# Patient Record
Sex: FEMALE | Race: WHITE | Employment: FULL TIME | URBAN - METROPOLITAN AREA
[De-identification: names, ages, dates, MRNs, and addresses within clinical notes are randomized per-mention and may not be internally consistent; named-entity substitution may affect disease eponyms.]

---

## 2017-03-30 ENCOUNTER — APPOINTMENT (OUTPATIENT)
Dept: PHYSICAL THERAPY | Age: 42
End: 2017-03-30

## 2017-04-14 ENCOUNTER — APPOINTMENT (OUTPATIENT)
Dept: PHYSICAL THERAPY | Age: 42
End: 2017-04-14

## 2019-10-28 ENCOUNTER — OFFICE VISIT (OUTPATIENT)
Dept: FAMILY MEDICINE CLINIC | Age: 44
End: 2019-10-28

## 2019-10-28 VITALS
TEMPERATURE: 98.6 F | HEIGHT: 68 IN | BODY MASS INDEX: 32.16 KG/M2 | RESPIRATION RATE: 20 BRPM | HEART RATE: 83 BPM | DIASTOLIC BLOOD PRESSURE: 89 MMHG | WEIGHT: 212.2 LBS | SYSTOLIC BLOOD PRESSURE: 153 MMHG | OXYGEN SATURATION: 93 %

## 2019-10-28 DIAGNOSIS — E66.9 OBESITY (BMI 30.0-34.9): ICD-10-CM

## 2019-10-28 DIAGNOSIS — L30.9 ECZEMA, UNSPECIFIED TYPE: ICD-10-CM

## 2019-10-28 DIAGNOSIS — M50.30 DDD (DEGENERATIVE DISC DISEASE), CERVICAL: ICD-10-CM

## 2019-10-28 DIAGNOSIS — I10 ESSENTIAL HYPERTENSION: Primary | ICD-10-CM

## 2019-10-28 DIAGNOSIS — E78.2 MIXED HYPERLIPIDEMIA: ICD-10-CM

## 2019-10-28 RX ORDER — BETAMETHASONE DIPROPIONATE 0.5 MG/G
1 CREAM TOPICAL 2 TIMES DAILY
COMMUNITY
End: 2019-10-28 | Stop reason: SDUPTHER

## 2019-10-28 RX ORDER — KETOCONAZOLE 20 MG/ML
1 SHAMPOO TOPICAL AS NEEDED
Refills: 3 | COMMUNITY
Start: 2019-09-24 | End: 2020-08-20

## 2019-10-28 RX ORDER — TRIAMCINOLONE ACETONIDE 0.25 MG/G
CREAM TOPICAL
Refills: 2 | COMMUNITY
Start: 2019-10-22 | End: 2020-08-20

## 2019-10-28 RX ORDER — AMITRIPTYLINE HYDROCHLORIDE 25 MG/1
TABLET, FILM COATED ORAL
Refills: 4 | COMMUNITY
Start: 2019-10-22 | End: 2019-10-28

## 2019-10-28 RX ORDER — MONTELUKAST SODIUM 10 MG/1
10 TABLET ORAL
COMMUNITY
Start: 2013-04-17 | End: 2020-08-20

## 2019-10-28 RX ORDER — PREGABALIN 100 MG/1
CAPSULE ORAL
Refills: 4 | COMMUNITY
Start: 2019-10-22 | End: 2020-08-20

## 2019-10-28 RX ORDER — BETAMETHASONE DIPROPIONATE 0.5 MG/G
1 CREAM TOPICAL 2 TIMES DAILY
Qty: 15 G | Refills: 5 | Status: SHIPPED | OUTPATIENT
Start: 2019-10-28 | End: 2020-08-20 | Stop reason: SDUPTHER

## 2019-10-28 RX ORDER — METHYLPREDNISOLONE 4 MG/1
TABLET ORAL
Refills: 0 | COMMUNITY
Start: 2019-09-27 | End: 2019-10-28

## 2019-10-28 RX ORDER — LOSARTAN POTASSIUM 50 MG/1
50 TABLET ORAL DAILY
Qty: 30 TAB | Refills: 2 | Status: SHIPPED | OUTPATIENT
Start: 2019-10-28 | End: 2019-12-05

## 2019-10-28 NOTE — PROGRESS NOTES
Curtis Elizabeth     Chief Complaint   Patient presents with    Establish Care    Hypertension     management    Allergies    Insomnia     Vitals:    10/28/19 1408   BP: 153/89   Pulse: 83   Resp: 20   Temp: 98.6 °F (37 °C)   TempSrc: Oral   SpO2: 93%   Weight: 212 lb 3.2 oz (96.3 kg)   Height: 5' 8\" (1.727 m)   PainSc:   0 - No pain   LMP: 10/11/2019         HPI: is here to establish care her previous PCP has retired 10 years ago ,has not been to any PCP ,but following up with a nurse at work ,BP has been High below are some recorded readings over the last few months    154/93   161 /92  173/114  163/111  160/100    Patient has chronic neck pain  Neck DDD as well as     Left arm pain due to an accident at work    seeing neurologist  Kyree Dodd she on lyrica     Had right hip replacement due to the same injury      Has hyperlipemia  Has  Not been on any medications                            Past Medical History:   Diagnosis Date    Eczema      Past Surgical History:   Procedure Laterality Date    HX HIP REPLACEMENT Right 2005     Social History     Tobacco Use    Smoking status: Current Some Day Smoker    Smokeless tobacco: Never Used   Substance Use Topics    Alcohol use: Yes       Family History   Problem Relation Age of Onset    Hypertension Mother     Diabetes Father     Hypertension Father     Breast Cancer Maternal Aunt        Review of Systems   Constitutional: Negative for chills, fever, malaise/fatigue and weight loss. HENT: Negative for congestion, ear discharge, ear pain, hearing loss, nosebleeds, sinus pain and sore throat. Eyes: Negative for blurred vision, double vision and discharge. Respiratory: Negative for cough, hemoptysis, sputum production and wheezing. Cardiovascular: Negative for chest pain, palpitations, claudication and leg swelling. Gastrointestinal: Negative for abdominal pain, constipation, diarrhea, nausea and vomiting.    Genitourinary: Negative for dysuria, frequency and urgency. Musculoskeletal: Negative for back pain, joint pain, myalgias and neck pain. Skin: Negative for itching and rash. Neurological: Positive for headaches. Negative for dizziness, tingling, sensory change, speech change, focal weakness and weakness. Psychiatric/Behavioral: Negative for depression, hallucinations, substance abuse and suicidal ideas. The patient is not nervous/anxious. Physical Exam   Constitutional: She is oriented to person, place, and time. She appears well-developed and well-nourished. No distress. HENT:   Head: Normocephalic and atraumatic. Mouth/Throat: Oropharynx is clear and moist. No oropharyngeal exudate. Eyes: Pupils are equal, round, and reactive to light. Conjunctivae are normal. Right eye exhibits no discharge. Left eye exhibits no discharge. No scleral icterus. Neck: No thyromegaly present. Cardiovascular: Normal rate, regular rhythm and normal heart sounds. No murmur heard. Pulmonary/Chest: Effort normal and breath sounds normal. No respiratory distress. She has no wheezes. She has no rales. She exhibits no tenderness. Abdominal: Soft. She exhibits no distension. There is no tenderness. There is no rebound. Musculoskeletal: Normal range of motion. She exhibits no edema, tenderness or deformity. Lymphadenopathy:     She has no cervical adenopathy. Neurological: She is alert and oriented to person, place, and time. No cranial nerve deficit. Coordination normal.   Skin: Skin is warm and dry. No rash noted. She is not diaphoretic. No erythema. No pallor. Psychiatric: She has a normal mood and affect. Her behavior is normal. Judgment and thought content normal.   Nursing note and vitals reviewed. Assessment and plan     Plan of care has been discussed with the patient, he agrees to the plan and verbalized understanding.   All his questions were answered  More than 50% of the time spent in this visit was counseling the patient about  illness and treatment options         1. Eczema, unspecified type    Has been advised to use this cream for max one week at a time to avoid skin thinning and hypo-and hyperpigmentation  - betamethasone dipropionate (DIPROSONE) 0.05 % topical cream; Apply 1 g to affected area two (2) times a day. Dispense: 15 g; Refill: 5    2. DDD (degenerative disc disease), cervical  Stable on Lyrica    3. Essential hypertension    Start patient on losartan 50 mg daily    - METABOLIC PANEL, COMPREHENSIVE; Future  - losartan (COZAAR) 50 mg tablet; Take 1 Tab by mouth daily. Dispense: 30 Tab; Refill: 2    4. Obesity (BMI 30.0-34. 9)    Lifestyle modification has been discussed with patient  5. Mixed hyperlipidemia    Has not been on any statin will check lipid profile    - LIPID PANEL; Future    Current Outpatient Medications   Medication Sig Dispense Refill    ketoconazole (NIZORAL) 2 % shampoo Apply 1 mL to affected area as needed. 3    pregabalin (LYRICA) 100 mg capsule TK ONE C PO ONCE A DAY IN THE MORNING AND 2 CS Q NIGHT  4    triamcinolone acetonide (KENALOG) 0.025 % topical cream TRACEY AA OF LEGS AND BREAST BID FOR 2 WEEKS FOR ECZEMA  2    Pamabrom (DIURETIC SOFTGELS) 50 mg cap Take 1 Cap by mouth daily.  betamethasone dipropionate (DIPROSONE) 0.05 % topical cream Apply 1 g to affected area two (2) times a day. 15 g 5    losartan (COZAAR) 50 mg tablet Take 1 Tab by mouth daily. 30 Tab 2    montelukast (SINGULAIR) 10 mg tablet Take 10 mg by mouth. There are no active problems to display for this patient. No results found for this or any previous visit. No visits with results within 3 Month(s) from this visit. Latest known visit with results is:   No results found for any previous visit. Follow-up and Dispositions    · Return in about 3 weeks (around 11/18/2019).

## 2019-10-28 NOTE — PROGRESS NOTES
Jen Cid is a 40 y.o. female presents in office for establish care, allergies, insomnia, and hypertension. Health Maintenance Due   Topic Date Due    Pneumococcal 0-64 years (1 of 1 - PPSV23) 09/10/1981    DTaP/Tdap/Td series (1 - Tdap) 09/10/1996    PAP AKA CERVICAL CYTOLOGY  09/10/1996     1. Have you been to the ER, urgent care clinic since your last visit? Hospitalized since your last visit?no    2. Have you seen or consulted any other health care providers outside of the [address] [place] [address] since your last visit? Include any pap smears or colon screening. Patient had pap smear this morning, along with lab work at [address] [place] [address]. I will have patient sign COLLIN.

## 2019-10-28 NOTE — PROGRESS NOTES
Kodak Abdalla Chief Complaint Patient presents with [address] [place] [address]  Hypertension  
  management  Allergies  Insomnia Vitals:  
 10/28/19 1408 BP: 153/89 Pulse: 83 Resp: 20 Temp: 98.6 °F (37 °C) TempSrc: Oral  
SpO2: 93% Weight: 212 lb 3.2 oz (96.3 kg) Height: 5' 8\" (1.727 m) PainSc:   0 - No pain LMP: 10/11/2019 HPI:She is here for dizziness , fatigue body aches,feverand chills for 2s=days Past Medical History:  
Diagnosis Date  Eczema Past Surgical History:  
Procedure Laterality Date  HX HIP REPLACEMENT Right 2005 Social History Tobacco Use  Smoking status: Current Some Day Smoker  Smokeless tobacco: Never Used Substance Use Topics  Alcohol use: Yes Family History Problem Relation Age of Onset  Hypertension Mother  Diabetes Father  Hypertension Father  Breast Cancer Maternal Aunt Review of Systems Constitutional: Positive for chills, diaphoresis, fever and malaise/fatigue. Negative for weight loss. HENT: Positive for congestion and ear pain. Negative for ear discharge, hearing loss, nosebleeds, sinus pain and sore throat. Eyes: Negative for blurred vision, double vision and discharge. Respiratory: Positive for cough and shortness of breath. Negative for hemoptysis, sputum production and wheezing. Cardiovascular: Negative for chest pain, palpitations, claudication and leg swelling. Gastrointestinal: Positive for nausea. Negative for abdominal pain, constipation, diarrhea, heartburn and vomiting. Genitourinary: Positive for frequency. Negative for dysuria and urgency. Musculoskeletal: Positive for back pain. Negative for joint pain, myalgias and neck pain. Skin: Negative for itching and rash. Neurological: Positive for dizziness and headaches. Negative for tingling, sensory change, speech change, focal weakness and weakness.   
 
 
Physical Exam  
 Constitutional: She is oriented to person, place, and time. She appears well-developed and well-nourished. No distress. HENT:  
Head: Normocephalic and atraumatic. Mouth/Throat: Oropharynx is clear and moist. No oropharyngeal exudate. Eyes: Pupils are equal, round, and reactive to light. Conjunctivae are normal. Right eye exhibits no discharge. Left eye exhibits no discharge. No scleral icterus. Neck: No thyromegaly present. Cardiovascular: Normal rate, regular rhythm and normal heart sounds. No murmur heard. Pulmonary/Chest: Effort normal and breath sounds normal. No respiratory distress. She has no wheezes. She has no rales. She exhibits no tenderness. Abdominal: Soft. She exhibits no distension. There is no tenderness. There is no rebound. Musculoskeletal: Normal range of motion. She exhibits tenderness. She exhibits no edema or deformity. Lymphadenopathy:  
  She has no cervical adenopathy. Neurological: She is alert and oriented to person, place, and time. No cranial nerve deficit. Coordination normal.  
Skin: Skin is warm and dry. No rash noted. She is not diaphoretic. No erythema. No pallor. Psychiatric: She has a normal mood and affect. Her behavior is normal. Judgment and thought content normal.  
Nursing note and vitals reviewed. Assessment and plan  
 
Plan of care has been discussed with the patient, he agrees to the plan and verbalized understanding. All his questions were answered More than 50% of the time spent in this visit was counseling the patient about  illness and treatment options 1. Eczema, unspecified type *** Current Outpatient Medications Medication Sig Dispense Refill  ketoconazole (NIZORAL) 2 % shampoo Apply 1 mL to affected area as needed.   3  
 pregabalin (LYRICA) 100 mg capsule TK ONE C PO ONCE A DAY IN THE MORNING AND 2 CS Q NIGHT  4  
 triamcinolone acetonide (KENALOG) 0.025 % topical cream TRACEY AA OF LEGS AND BREAST BID FOR 2 WEEKS FOR ECZEMA  2  
 betamethasone dipropionate (DIPROSONE) 0.05 % topical cream Apply 1 g to affected area two (2) times a day.  Pamabrom (DIURETIC SOFTGELS) 50 mg cap Take 1 Cap by mouth daily.  amitriptyline (ELAVIL) 25 mg tablet TK 1 T PO ATN  4  
 methylPREDNISolone (MEDROL DOSEPACK) 4 mg tablet FPD  0  
 montelukast (SINGULAIR) 10 mg tablet Take 10 mg by mouth. There are no active problems to display for this patient. No results found for this or any previous visit. No visits with results within 3 Month(s) from this visit. Latest known visit with results is: No results found for any previous visit.

## 2019-11-18 ENCOUNTER — OFFICE VISIT (OUTPATIENT)
Dept: FAMILY MEDICINE CLINIC | Age: 44
End: 2019-11-18

## 2019-11-18 VITALS
WEIGHT: 217 LBS | OXYGEN SATURATION: 94 % | HEART RATE: 77 BPM | BODY MASS INDEX: 32.89 KG/M2 | HEIGHT: 68 IN | RESPIRATION RATE: 16 BRPM | SYSTOLIC BLOOD PRESSURE: 166 MMHG | DIASTOLIC BLOOD PRESSURE: 115 MMHG | TEMPERATURE: 98.3 F

## 2019-11-18 DIAGNOSIS — E66.9 OBESITY (BMI 30.0-34.9): ICD-10-CM

## 2019-11-18 DIAGNOSIS — I10 ESSENTIAL HYPERTENSION: Primary | ICD-10-CM

## 2019-11-18 DIAGNOSIS — J30.9 ALLERGIC RHINITIS, UNSPECIFIED SEASONALITY, UNSPECIFIED TRIGGER: ICD-10-CM

## 2019-11-18 DIAGNOSIS — E78.2 MIXED HYPERLIPIDEMIA: ICD-10-CM

## 2019-11-18 DIAGNOSIS — I10 HYPERTENSION, UNSPECIFIED TYPE: ICD-10-CM

## 2019-11-18 RX ORDER — FLUTICASONE PROPIONATE 50 MCG
2 SPRAY, SUSPENSION (ML) NASAL DAILY
COMMUNITY
End: 2019-11-18 | Stop reason: SDUPTHER

## 2019-11-18 RX ORDER — DILTIAZEM HYDROCHLORIDE 120 MG/1
120 CAPSULE, COATED, EXTENDED RELEASE ORAL DAILY
Qty: 30 CAP | Refills: 0 | Status: SHIPPED | OUTPATIENT
Start: 2019-11-18 | End: 2019-12-12 | Stop reason: SDUPTHER

## 2019-11-18 RX ORDER — HYDROCHLOROTHIAZIDE 12.5 MG/1
12.5 TABLET ORAL DAILY
Qty: 90 TAB | Refills: 0 | Status: SHIPPED | OUTPATIENT
Start: 2019-11-18 | End: 2019-12-12 | Stop reason: SDUPTHER

## 2019-11-18 RX ORDER — FLUTICASONE PROPIONATE 50 MCG
2 SPRAY, SUSPENSION (ML) NASAL DAILY
Qty: 1 BOTTLE | Refills: 5 | Status: SHIPPED | OUTPATIENT
Start: 2019-11-18 | End: 2020-08-20 | Stop reason: SDUPTHER

## 2019-11-18 NOTE — PROGRESS NOTES
Curtis Johnson presents today for   Chief Complaint   Patient presents with    Hypertension     f/u       Is someone accompanying this pt? no    Is the patient using any DME equipment during OV? no    Depression Screening:  3 most recent PHQ Screens 10/28/2019   Little interest or pleasure in doing things Not at all   Feeling down, depressed, irritable, or hopeless Not at all   Total Score PHQ 2 0       Learning Assessment:  Learning Assessment 10/28/2019   PRIMARY LEARNER Patient   PRIMARY LANGUAGE ENGLISH   LEARNER PREFERENCE PRIMARY PICTURES     LISTENING     READING     DEMONSTRATION     VIDEOS   ANSWERED BY patient   RELATIONSHIP SELF       Abuse Screening:  Abuse Screening Questionnaire 10/28/2019   Do you ever feel afraid of your partner? N   Are you in a relationship with someone who physically or mentally threatens you? N   Is it safe for you to go home? Y       Fall Risk  Fall Risk Assessment, last 12 mths 10/28/2019   Able to walk? Yes   Fall in past 12 months? No       Health Maintenance reviewed and discussed and ordered per Provider. Health Maintenance Due   Topic Date Due    Pneumococcal 0-64 years (1 of 1 - PPSV23) 09/10/1981    DTaP/Tdap/Td series (1 - Tdap) 09/10/1996    PAP AKA CERVICAL CYTOLOGY  09/10/1996   . Patient had pap smear recently along with lab work at [address] [place] [address]. Patient signed COLLIN last visit. We have not gotten records as of yet. Pt currently taking Antiplatelet therapy? no    Coordination of Care:  1. Have you been to the ER, urgent care clinic since your last visit? Hospitalized since your last visit? no    2. Have you seen or consulted any other health care providers outside of the [address] [place] [address] since your last visit? Include any pap smears or colon screening.  no

## 2019-11-18 NOTE — PROGRESS NOTES
Shabana Martinez     Chief Complaint   Patient presents with    Hypertension     f/u     Vitals:    11/18/19 1545   BP: (!) 166/115   Pulse: 77   Resp: 16   Temp: 98.3 °F (36.8 °C)   TempSrc: Oral   SpO2: 94%   Weight: 217 lb (98.4 kg)   Height: 5' 8\" (1.727 m)   PainSc:   0 - No pain         HPI:Guera Maya  Is here for follow up on hypertension ,she is on losartan 50 mg daily blood pressure readings are worse !! No symptoms related to hypertension . She has allergic rhinitis she need refill for nasal spray     Past Medical History:   Diagnosis Date    Eczema      Past Surgical History:   Procedure Laterality Date    HX HIP REPLACEMENT Right 2005     Social History     Tobacco Use    Smoking status: Current Some Day Smoker    Smokeless tobacco: Never Used   Substance Use Topics    Alcohol use: Yes       Family History   Problem Relation Age of Onset    Hypertension Mother     Diabetes Father     Hypertension Father     Breast Cancer Maternal Aunt        Review of Systems   Constitutional: Negative for chills, fever, malaise/fatigue and weight loss. HENT: Negative for congestion, ear discharge, ear pain, hearing loss, nosebleeds and sinus pain. Eyes: Negative for blurred vision, double vision and discharge. Respiratory: Negative for cough. Cardiovascular: Negative for chest pain, palpitations, claudication and leg swelling. Gastrointestinal: Negative for abdominal pain, constipation, diarrhea, nausea and vomiting. Genitourinary: Negative for dysuria, frequency, hematuria and urgency. Musculoskeletal: Negative for myalgias. Skin: Negative for itching and rash. Neurological: Negative for dizziness, tingling, sensory change, speech change, focal weakness, weakness and headaches. Physical Exam   Constitutional: She is oriented to person, place, and time. She appears well-developed and well-nourished. No distress. HENT:   Head: Normocephalic and atraumatic.    Mouth/Throat: Oropharynx is clear and moist. No oropharyngeal exudate. Eyes: Pupils are equal, round, and reactive to light. Conjunctivae are normal. Right eye exhibits no discharge. Left eye exhibits no discharge. No scleral icterus. Neck: Normal range of motion. Neck supple. Cardiovascular: Normal rate, regular rhythm and normal heart sounds. No murmur heard. Pulmonary/Chest: Effort normal and breath sounds normal. No respiratory distress. She has no wheezes. She has no rales. She exhibits no tenderness. Abdominal: Soft. She exhibits no distension. There is no tenderness. There is no rebound. Musculoskeletal: Normal range of motion. She exhibits no edema, tenderness or deformity. Neurological: She is alert and oriented to person, place, and time. No cranial nerve deficit. Coordination normal.   Skin: Skin is warm and dry. No rash noted. She is not diaphoretic. No erythema. No pallor. Psychiatric: She has a normal mood and affect. Her behavior is normal. Judgment and thought content normal.        Assessment and plan     Plan of care has been discussed with the patient, he agrees to the plan and verbalized understanding. All his questions were answered  More than 50% of the time spent in this visit was counseling the patient about  illness and treatment options         1. Allergic rhinitis, unspecified seasonality, unspecified trigger    - fluticasone propionate (FLONASE) 50 mcg/actuation nasal spray; 2 Sprays by Both Nostrils route daily. Dispense: 1 Bottle; Refill: 5    2. Essential hypertension    Blood pressure reading has gotten worse he was losartan 50 mg daily for 3 weeks    We will stop losartan and start patient on Cardizem  To rule out hyperaldosteronism    - dilTIAZem CD (CARDIZEM CD) 120 mg ER capsule; Take 1 Cap by mouth daily. Dispense: 30 Cap; Refill: 0  - hydroCHLOROthiazide (HYDRODIURIL) 12.5 mg tablet; Take 1 Tab by mouth daily. Dispense: 90 Tab; Refill: 0    3.  Mixed hyperlipidemia  Not currently on any statins    4. Obesity (BMI 30.0-34. 9)  She has been trying to loose weigh    Patient has gained about 60 pounds in the last 6 years  - REFERRAL TO PHYSICAL THERAPY    5. Hypertension, unspecified type    - ALDOSTERONE/RENIN RATIO; Future    Current Outpatient Medications   Medication Sig Dispense Refill    fluticasone propionate (FLONASE) 50 mcg/actuation nasal spray 2 Sprays by Both Nostrils route daily. 1 Bottle 5    dilTIAZem CD (CARDIZEM CD) 120 mg ER capsule Take 1 Cap by mouth daily. 30 Cap 0    hydroCHLOROthiazide (HYDRODIURIL) 12.5 mg tablet Take 1 Tab by mouth daily. 90 Tab 0    ketoconazole (NIZORAL) 2 % shampoo Apply 1 mL to affected area as needed. 3    triamcinolone acetonide (KENALOG) 0.025 % topical cream TRACEY AA OF LEGS AND BREAST BID FOR 2 WEEKS FOR ECZEMA  2    Pamabrom (DIURETIC SOFTGELS) 50 mg cap Take 1 Cap by mouth daily.  betamethasone dipropionate (DIPROSONE) 0.05 % topical cream Apply 1 g to affected area two (2) times a day. 15 g 5    losartan (COZAAR) 50 mg tablet Take 1 Tab by mouth daily. 30 Tab 2    montelukast (SINGULAIR) 10 mg tablet Take 10 mg by mouth.  pregabalin (LYRICA) 100 mg capsule TK ONE C PO ONCE A DAY IN THE MORNING AND 2 CS Q NIGHT  4       There are no active problems to display for this patient. No results found for this or any previous visit. No visits with results within 3 Month(s) from this visit. Latest known visit with results is:   No results found for any previous visit. Follow-up and Dispositions    · Return in about 3 weeks (around 12/9/2019).

## 2019-11-18 NOTE — PATIENT INSTRUCTIONS
Low Sodium Diet (2,000 Milligram): Care Instructions  Your Care Instructions    Too much sodium causes your body to hold on to extra water. This can raise your blood pressure and force your heart and kidneys to work harder. In very serious cases, this could cause you to be put in the hospital. It might even be life-threatening. By limiting sodium, you will feel better and lower your risk of serious problems. The most common source of sodium is salt. People get most of the salt in their diet from canned, prepared, and packaged foods. Fast food and restaurant meals also are very high in sodium. Your doctor will probably limit your sodium to less than 2,000 milligrams (mg) a day. This limit counts all the sodium in prepared and packaged foods and any salt you add to your food. Follow-up care is a key part of your treatment and safety. Be sure to make and go to all appointments, and call your doctor if you are having problems. It's also a good idea to know your test results and keep a list of the medicines you take. How can you care for yourself at home? Read food labels  · Read labels on cans and food packages. The labels tell you how much sodium is in each serving. Make sure that you look at the serving size. If you eat more than the serving size, you have eaten more sodium. · Food labels also tell you the Percent Daily Value for sodium. Choose products with low Percent Daily Values for sodium. · Be aware that sodium can come in forms other than salt, including monosodium glutamate (MSG), sodium citrate, and sodium bicarbonate (baking soda). MSG is often added to Asian food. When you eat out, you can sometimes ask for food without MSG or added salt. Buy low-sodium foods  · Buy foods that are labeled \"unsalted\" (no salt added), \"sodium-free\" (less than 5 mg of sodium per serving), or \"low-sodium\" (less than 140 mg of sodium per serving).  Foods labeled \"reduced-sodium\" and \"light sodium\" may still have too much sodium. Be sure to read the label to see how much sodium you are getting. · Buy fresh vegetables, or frozen vegetables without added sauces. Buy low-sodium versions of canned vegetables, soups, and other canned goods. Prepare low-sodium meals  · Cut back on the amount of salt you use in cooking. This will help you adjust to the taste. Do not add salt after cooking. One teaspoon of salt has about 2,300 mg of sodium. · Take the salt shaker off the table. · Flavor your food with garlic, lemon juice, onion, vinegar, herbs, and spices. Do not use soy sauce, lite soy sauce, steak sauce, onion salt, garlic salt, celery salt, mustard, or ketchup on your food. · Use low-sodium salad dressings, sauces, and ketchup. Or make your own salad dressings and sauces without adding salt. · Use less salt (or none) when recipes call for it. You can often use half the salt a recipe calls for without losing flavor. Other foods such as rice, pasta, and grains do not need added salt. · Rinse canned vegetables, and cook them in fresh water. This removes some--but not all--of the salt. · Avoid water that is naturally high in sodium or that has been treated with water softeners, which add sodium. Call your local water company to find out the sodium content of your water supply. If you buy bottled water, read the label and choose a sodium-free brand. Avoid high-sodium foods  · Avoid eating:  ? Smoked, cured, salted, and canned meat, fish, and poultry. ? Ham, abad, hot dogs, and luncheon meats. ? Regular, hard, and processed cheese and regular peanut butter. ? Crackers with salted tops, and other salted snack foods such as pretzels, chips, and salted popcorn. ? Frozen prepared meals, unless labeled low-sodium. ? Canned and dried soups, broths, and bouillon, unless labeled sodium-free or low-sodium. ? Canned vegetables, unless labeled sodium-free or low-sodium. ? [place] fries, pizza, tacos, and other fast foods.   ? Adam Gardner olives, ketchup, and other condiments, especially soy sauce, unless labeled sodium-free or low-sodium. Where can you learn more? Go to http://josé manuel-kaz.info/. Enter R856 in the search box to learn more about \"Low Sodium Diet (2,000 Milligram): Care Instructions. \"  Current as of: November 7, 2018  Content Version: 12.2  © 8633-9772 [employer]. Care instructions adapted under license by [employer] (which disclaims liability or warranty for this information). If you have questions about a medical condition or this instruction, always ask your healthcare professional. [address] any warranty or liability for your use of this information. How to Read a Food Label to Limit Sodium: Care Instructions  Your Care Instructions  Sodium causes your body to hold on to extra water. This can raise your blood pressure and force your heart and kidneys to work harder. In very serious cases, this could cause you to be put in the hospital. It might even be life-threatening. By limiting sodium, you will feel better and lower your risk of serious problems. Processed foods, fast food, and restaurant foods are the major sources of dietary sodium. The most common name for sodium is salt. Try to limit how much sodium you eat to less than 2,300 milligrams (mg) a day. If you limit your sodium to 1,500 mg a day, you can lower your blood pressure even more. This limit counts all the salt that you eat in foods you cook or in packaged foods. Keep a list of everything you eat and drink. Follow-up care is a key part of your treatment and safety. Be sure to make and go to all appointments, and call your doctor if you are having problems. It's also a good idea to know your test results and keep a list of the medicines you take. How can you care for yourself at home?   Read ingredient lists on food labels  · Read the list of ingredients on food labels to help you find how much sodium is in a food. The label lists the ingredients in a food in descending order (from the most to the least). If salt or sodium is high on the list, there may be a lot of sodium in the food. · Know that sodium has different names. Sodium is also called monosodium glutamate (MSG, common in [place] food), sodium citrate, sodium alginate, sodium hydroxide, and sodium phosphate. Read Nutrition Facts labels  · On most foods, there is a Nutrition Facts label. This will tell you how much sodium is in one serving of food. Look at both the serving size and the sodium amount. The serving size is located at the top of the label, usually right under the \"Nutrition Facts\" title. The amount of sodium is given in the list under the title. It is given in milligrams (mg). ? Check the serving size carefully. A single serving is often very small, and you may eat more than one serving. If this is the case, you will eat more sodium than listed on the label. For example, if the serving size for a canned soup is 1 cup and the sodium amount is 470 mg, if you have 2 cups you will eat 940 mg of sodium. · The nutrition facts for fresh fruits and vegetables are not listed on the food. They may be listed somewhere in the store. These foods usually have no sodium or low sodium. · The Nutrition Facts label also gives you the Percent Daily Value for sodium. This is how much of the recommended amount of sodium a serving contains. The daily value for sodium is less than 2,300 mg. So if the Percent Daily Value says 50%, this means one serving is giving you half of this, or 1,150 mg. Buy low-sodium foods  · Look for foods that are made with less sodium. Watch for the following words on the label. ? \"Unsalted\" means there is no sodium added to the food. But there may be sodium already in the food naturally. ? \"Sodium-free\" means a serving has less than 5 mg of sodium. ?  \"Very low sodium\" means a serving has 35 mg or less of sodium. ? \"Low-sodium\" means a serving has 140 mg or less of sodium. · \"Reduced-sodium\" means that there is 25% less sodium than what the food normally has. This is still usually too much sodium. Try not to buy foods with this on the label. · Buy fresh vegetables, or frozen vegetables without added sauces. Buy low-sodium versions of canned vegetables, soups, and other canned goods. Where can you learn more? Go to http://josé manuel-kaz.info/. Enter 26 [id number] in the search box to learn more about \"How to Read a Food Label to Limit Sodium: Care Instructions. \"  Current as of: November 7, 2018  Content Version: 12.2  © 8685-7275 [employer]. Care instructions adapted under license by [employer] (which disclaims liability or warranty for this information). If you have questions about a medical condition or this instruction, always ask your healthcare professional. [place] [address] any warranty or liability for your use of this information. Heart-Healthy Diet: Care Instructions  Your Care Instructions    A heart-healthy diet has lots of vegetables, fruits, nuts, beans, and whole grains, and is low in salt. It limits foods that are high in saturated fat, such as meats, cheeses, and fried foods. It may be hard to change your diet, but even small changes can lower your risk of heart attack and heart disease. Follow-up care is a key part of your treatment and safety. Be sure to make and go to all appointments, and call your doctor if you are having problems. It's also a good idea to know your test results and keep a list of the medicines you take. How can you care for yourself at home? Watch your portions  · Learn what a serving is. A \"serving\" and a \"portion\" are not always the same thing. Make sure that you are not eating larger portions than are recommended. For example, a serving of pasta is ½ cup. A serving size of meat is 2 to 3 ounces.  A 3-ounce serving is about the size of a deck of cards. Measure serving sizes until you are good at [place]" them. Keep in mind that restaurants often serve portions that are 2 or 3 times the size of one serving. · To keep your energy level up and keep you from feeling hungry, eat often but in smaller portions. · Eat only the number of calories you need to stay at a healthy weight. If you need to lose weight, eat fewer calories than your body burns (through exercise and other physical activity). Eat more fruits and vegetables  · Eat a variety of fruit and vegetables every day. Dark green, deep orange, red, or yellow fruits and vegetables are especially good for you. Examples include spinach, carrots, peaches, and berries. · Keep carrots, celery, and other veggies handy for snacks. Buy fruit that is in season and store it where you can see it so that you will be tempted to eat it. · Cook dishes that have a lot of veggies in them, such as stir-fries and soups. Limit saturated and trans fat  · Read food labels, and try to avoid saturated and trans fats. They increase your risk of heart disease. Trans fat is found in many processed foods such as cookies and crackers. · Use olive or canola oil when you cook. Try cholesterol-lowering spreads, such as Benecol or Take Control. · Bake, broil, grill, or steam foods instead of frying them. · Choose lean meats instead of high-fat meats such as hot dogs and sausages. Cut off all visible fat when you prepare meat. · Eat fish, skinless poultry, and meat alternatives such as soy products instead of high-fat meats. Soy products, such as tofu, may be especially good for your heart. · Choose low-fat or fat-free milk and dairy products. Eat fish  · Eat at least two servings of fish a week. Certain fish, such as salmon and tuna, contain omega-3 fatty acids, which may help reduce your risk of heart attack. Eat foods high in fiber  · Eat a variety of grain products every day. Include whole-grain foods that have lots of fiber and nutrients. Examples of whole-grain foods include oats, whole wheat bread, and brown rice. · Buy whole-grain breads and cereals, instead of white bread or pastries. Limit salt and sodium  · Limit how much salt and sodium you eat to help lower your blood pressure. · Taste food before you salt it. Add only a little salt when you think you need it. With time, your taste buds will adjust to less salt. · Eat fewer snack items, fast foods, and other high-salt, processed foods. Check food labels for the amount of sodium in packaged foods. · Choose low-sodium versions of canned goods (such as soups, vegetables, and beans). Limit sugar  · Limit drinks and foods with added sugar. These include candy, desserts, and soda pop. Limit alcohol  · Limit alcohol to no more than 2 drinks a day for men and 1 drink a day for women. Too much alcohol can cause health problems. When should you call for help? Watch closely for changes in your health, and be sure to contact your doctor if:    · You would like help planning heart-healthy meals. Where can you learn more? Go to http://josé manuel-kaz.info/. Enter V137 in the search box to learn more about \"Heart-Healthy Diet: Care Instructions. \"  Current as of: April 9, 2019  Content Version: 12.2  © 1741-6236 [employer]. Care instructions adapted under license by [employer] (which disclaims liability or warranty for this information). If you have questions about a medical condition or this instruction, always ask your healthcare professional. [place] [address] any warranty or liability for your use of this information.

## 2019-12-05 ENCOUNTER — OFFICE VISIT (OUTPATIENT)
Dept: FAMILY MEDICINE CLINIC | Age: 44
End: 2019-12-05

## 2019-12-05 ENCOUNTER — HOSPITAL ENCOUNTER (OUTPATIENT)
Dept: LAB | Age: 44
Discharge: HOME | End: 2019-12-05
Payer: COMMERCIAL

## 2019-12-05 VITALS
DIASTOLIC BLOOD PRESSURE: 81 MMHG | RESPIRATION RATE: 18 BRPM | WEIGHT: 215 LBS | SYSTOLIC BLOOD PRESSURE: 131 MMHG | HEART RATE: 73 BPM | OXYGEN SATURATION: 98 % | HEIGHT: 68 IN | BODY MASS INDEX: 32.58 KG/M2 | TEMPERATURE: 98.5 F

## 2019-12-05 DIAGNOSIS — Z01.818 PRE-OP EXAMINATION: ICD-10-CM

## 2019-12-05 DIAGNOSIS — Z01.818 PRE-OP EXAMINATION: Primary | ICD-10-CM

## 2019-12-05 DIAGNOSIS — I10 ESSENTIAL HYPERTENSION: ICD-10-CM

## 2019-12-05 DIAGNOSIS — G47.09 OTHER INSOMNIA: ICD-10-CM

## 2019-12-05 LAB
ALBUMIN SERPL-MCNC: 4.1 G/DL (ref 3.4–5)
ALBUMIN/GLOB SERPL: 1.3 {RATIO} (ref 0.8–1.7)
ALP SERPL-CCNC: 64 U/L (ref 45–117)
ALT SERPL-CCNC: 37 U/L (ref 13–56)
ANION GAP SERPL CALC-SCNC: 5 MMOL/L (ref 3–18)
AST SERPL-CCNC: 21 U/L (ref 10–38)
BASOPHILS # BLD: 0 K/UL (ref 0–0.1)
BASOPHILS NFR BLD: 0 % (ref 0–2)
BILIRUB SERPL-MCNC: 0.3 MG/DL (ref 0.2–1)
BUN SERPL-MCNC: 11 MG/DL (ref 7–18)
BUN/CREAT SERPL: 17 (ref 12–20)
CALCIUM SERPL-MCNC: 9 MG/DL (ref 8.5–10.1)
CHLORIDE SERPL-SCNC: 106 MMOL/L (ref 100–111)
CO2 SERPL-SCNC: 27 MMOL/L (ref 21–32)
CREAT SERPL-MCNC: 0.63 MG/DL (ref 0.6–1.3)
DIFFERENTIAL METHOD BLD: ABNORMAL
EOSINOPHIL # BLD: 0.2 K/UL (ref 0–0.4)
EOSINOPHIL NFR BLD: 3 % (ref 0–5)
ERYTHROCYTE [DISTWIDTH] IN BLOOD BY AUTOMATED COUNT: 13.1 % (ref 11.6–14.5)
GLOBULIN SER CALC-MCNC: 3.1 G/DL (ref 2–4)
GLUCOSE SERPL-MCNC: 79 MG/DL (ref 74–99)
HCT VFR BLD AUTO: 40.7 % (ref 35–45)
HGB BLD-MCNC: 13.4 G/DL (ref 12–16)
LYMPHOCYTES # BLD: 2.2 K/UL (ref 0.9–3.6)
LYMPHOCYTES NFR BLD: 30 % (ref 21–52)
MCH RBC QN AUTO: 32.1 PG (ref 24–34)
MCHC RBC AUTO-ENTMCNC: 32.9 G/DL (ref 31–37)
MCV RBC AUTO: 97.4 FL (ref 74–97)
MONOCYTES # BLD: 0.6 K/UL (ref 0.05–1.2)
MONOCYTES NFR BLD: 8 % (ref 3–10)
NEUTS SEG # BLD: 4.4 K/UL (ref 1.8–8)
NEUTS SEG NFR BLD: 59 % (ref 40–73)
PLATELET # BLD AUTO: 321 K/UL (ref 135–420)
PMV BLD AUTO: 10.3 FL (ref 9.2–11.8)
POTASSIUM SERPL-SCNC: 4 MMOL/L (ref 3.5–5.5)
PROT SERPL-MCNC: 7.2 G/DL (ref 6.4–8.2)
RBC # BLD AUTO: 4.18 M/UL (ref 4.2–5.3)
SODIUM SERPL-SCNC: 138 MMOL/L (ref 136–145)
WBC # BLD AUTO: 7.5 K/UL (ref 4.6–13.2)

## 2019-12-05 PROCEDURE — 36415 COLL VENOUS BLD VENIPUNCTURE: CPT

## 2019-12-05 PROCEDURE — 85025 COMPLETE CBC W/AUTO DIFF WBC: CPT

## 2019-12-05 PROCEDURE — 80053 COMPREHEN METABOLIC PANEL: CPT

## 2019-12-05 NOTE — PROGRESS NOTES
Juan oJse Aguilar     Chief Complaint   Patient presents with   Raoul Garcia Pre-op Exam     carpal tunnel release left side with Dr. Carlos Masterson on 12-13-19     Vitals:    12/05/19 1249   BP: 131/81   Pulse: 73   Resp: 18   Temp: 98.5 °F (36.9 °C)   TempSrc: Oral   SpO2: 98%   Weight: 215 lb (97.5 kg)   Height: 5' 8\" (1.727 m)   PainSc:   4   PainLoc: Wrist   LMP: 11/06/2019         HPI: Mrs. Juan Jose Aguilar is here for preop examination. She is having carpal tunnel syndrome left wrist, and cubital canal compression syndrome left      Surgery is left carpal tunnel release and left ulnar nerve decompression  Her surgery [facility] at [facility] on December 13, 2019 under general anesthesia    Dr. Tricia Crowell with [address] for surgery are CBC CMP and EKG      Past Medical History:   Diagnosis Date    Eczema      Past Surgical History:   Procedure Laterality Date    HX HIP REPLACEMENT Right 2005     Social History     Tobacco Use    Smoking status: Current Some Day Smoker    Smokeless tobacco: Never Used   Substance Use Topics    Alcohol use: Yes       Family History   Problem Relation Age of Onset    Hypertension Mother     Diabetes Father     Hypertension Father     Breast Cancer Maternal Aunt        Review of Systems   Constitutional: Negative for chills, fever, malaise/fatigue and weight loss. HENT: Negative for congestion, ear discharge, ear pain, hearing loss, nosebleeds, sinus pain and sore throat. Eyes: Negative for blurred vision, double vision and discharge. Respiratory: Negative for cough, hemoptysis, sputum production, shortness of breath and wheezing. Cardiovascular: Negative for chest pain, palpitations, claudication and leg swelling. Gastrointestinal: Negative for abdominal pain, constipation, diarrhea, nausea and vomiting. Genitourinary: Negative for dysuria, frequency and urgency. Musculoskeletal: Positive for joint pain and myalgias.  Negative for back pain and neck pain. Skin: Negative for itching and rash. Neurological: Negative for dizziness, tingling, sensory change, speech change, focal weakness, weakness and headaches. Psychiatric/Behavioral: Negative for depression, hallucinations, substance abuse and suicidal ideas. The patient has insomnia. The patient is not nervous/anxious. Physical Exam  Vitals signs and nursing note reviewed. Constitutional:       General: She is not in acute distress. Appearance: She is well-developed. She is not diaphoretic. HENT:      Head: Normocephalic and atraumatic. Mouth/Throat:      Pharynx: No oropharyngeal exudate. Eyes:      General: No scleral icterus. Right eye: No discharge. Left eye: No discharge. Conjunctiva/sclera: Conjunctivae normal.      Pupils: Pupils are equal, round, and reactive to light. Neck:      Thyroid: No thyromegaly. Cardiovascular:      Rate and Rhythm: Normal rate and regular rhythm. Heart sounds: Normal heart sounds. No murmur. Pulmonary:      Effort: Pulmonary effort is normal. No respiratory distress. Breath sounds: Normal breath sounds. No wheezing or rales. Chest:      Chest wall: No tenderness. Abdominal:      General: There is no distension. Palpations: Abdomen is soft. Tenderness: There is no tenderness. There is no rebound. Musculoskeletal: Normal range of motion. General: Tenderness present. No deformity. Comments: Left wrist tenderness and left cubital area tenderness in the left side   Lymphadenopathy:      Cervical: No cervical adenopathy. Skin:     General: Skin is warm and dry. Coloration: Skin is not pale. Findings: No erythema or rash. Neurological:      Mental Status: She is alert and oriented to person, place, and time. Cranial Nerves: No cranial nerve deficit. Coordination: Coordination normal.   Psychiatric:         Behavior: Behavior normal.         Thought Content:  Thought content normal.         Judgment: Judgment normal.          Assessment and plan     Plan of care has been discussed with the patient, he agrees to the plan and verbalized understanding. All his questions were answered  More than 50% of the time spent in this visit was counseling the patient about  illness and treatment options         1. Pre-op examination    EKG normal sinus rhythm no acute changes    CBC and CMP results were reviewed and are within normal limits  Patient is medically clear for  Left carpal tunnel syndrome and left ulnar nerve decompression under general anesthesia    Patient is low risk          - CBC WITH AUTOMATED DIFF; Future  - METABOLIC PANEL, COMPREHENSIVE; Future  - AMB POC EKG ROUTINE W/ 12 LEADS, INTER & REP    2. Essential hypertension  Blood pressure is well controlled on diltiazem 120 and hydrochlorothiazide 12.5 continue same medications    3. Other insomnia  Patient thinks that diltiazem has worsening her insomnia, we will observe for about 4 to 6 weeks and reevaluate, possibly change medication if insomnia persists. Current Outpatient Medications   Medication Sig Dispense Refill    fluticasone propionate (FLONASE) 50 mcg/actuation nasal spray 2 Sprays by Both Nostrils route daily. 1 Bottle 5    dilTIAZem CD (CARDIZEM CD) 120 mg ER capsule Take 1 Cap by mouth daily. 30 Cap 0    hydroCHLOROthiazide (HYDRODIURIL) 12.5 mg tablet Take 1 Tab by mouth daily. 90 Tab 0    ketoconazole (NIZORAL) 2 % shampoo Apply 1 mL to affected area as needed. 3    montelukast (SINGULAIR) 10 mg tablet Take 10 mg by mouth.  pregabalin (LYRICA) 100 mg capsule TK ONE C PO ONCE A DAY IN THE MORNING AND 2 CS Q NIGHT  4    triamcinolone acetonide (KENALOG) 0.025 % topical cream TRACEY AA OF LEGS AND BREAST BID FOR 2 WEEKS FOR ECZEMA  2    Pamabrom (DIURETIC SOFTGELS) 50 mg cap Take 1 Cap by mouth daily.       betamethasone dipropionate (DIPROSONE) 0.05 % topical cream Apply 1 g to affected area two (2) times a day. 15 g 5       There are no active problems to display for this patient. No results found for this or any previous visit. Hospital Outpatient Visit on 12/05/2019   Component Date Value Ref Range Status    WBC 12/05/2019 7.5  4.6 - 13.2 K/uL Final    RBC 12/05/2019 4.18* 4.20 - 5.30 M/uL Final    HGB 12/05/2019 13.4  12.0 - 16.0 g/dL Final    HCT 12/05/2019 40.7  35.0 - 45.0 % Final    MCV 12/05/2019 97.4* 74.0 - 97.0 FL Final    MCH 12/05/2019 32.1  24.0 - 34.0 PG Final    MCHC 12/05/2019 32.9  31.0 - 37.0 g/dL Final    RDW 12/05/2019 13.1  11.6 - 14.5 % Final    PLATELET 40/85/7205 341  135 - 420 K/uL Final    MPV 12/05/2019 10.3  9.2 - 11.8 FL Final    NEUTROPHILS 12/05/2019 59  40 - 73 % Final    LYMPHOCYTES 12/05/2019 30  21 - 52 % Final    MONOCYTES 12/05/2019 8  3 - 10 % Final    EOSINOPHILS 12/05/2019 3  0 - 5 % Final    BASOPHILS 12/05/2019 0  0 - 2 % Final    ABS. NEUTROPHILS 12/05/2019 4.4  1.8 - 8.0 K/UL Final    ABS. LYMPHOCYTES 12/05/2019 2.2  0.9 - 3.6 K/UL Final    ABS. MONOCYTES 12/05/2019 0.6  0.05 - 1.2 K/UL Final    ABS. EOSINOPHILS 12/05/2019 0.2  0.0 - 0.4 K/UL Final    ABS.  BASOPHILS 12/05/2019 0.0  0.0 - 0.1 K/UL Final    DF 12/05/2019 AUTOMATED    Final    Sodium 12/05/2019 138  136 - 145 mmol/L Final    Potassium 12/05/2019 4.0  3.5 - 5.5 mmol/L Final    Chloride 12/05/2019 106  100 - 111 mmol/L Final    CO2 12/05/2019 27  21 - 32 mmol/L Final    Anion gap 12/05/2019 5  3.0 - 18 mmol/L Final    Glucose 12/05/2019 79  74 - 99 mg/dL Final    BUN 12/05/2019 11  7.0 - 18 MG/DL Final    Creatinine 12/05/2019 0.63  0.6 - 1.3 MG/DL Final    BUN/Creatinine ratio 12/05/2019 17  12 - 20   Final    GFR est AA 12/05/2019 >60  >60 ml/min/1.73m2 Final    GFR est non-AA 12/05/2019 >60  >60 ml/min/1.73m2 Final    Comment: (NOTE)  Estimated GFR is calculated using the Modification of Diet in Renal   Disease (MDRD) Study equation, reported for both  Americans   (GFRAA) and non- Americans (GFRNA), and normalized to 1.73m2   body surface area. The physician must decide which value applies to   the patient. The MDRD study equation should only be used in   individuals age 25 or older. It has not been validated for the   following: pregnant women, patients with serious comorbid conditions,   or on certain medications, or persons with extremes of body size,   muscle mass, or nutritional status.  Calcium 12/05/2019 9.0  8.5 - 10.1 MG/DL Final    Bilirubin, total 12/05/2019 0.3  0.2 - 1.0 MG/DL Final    ALT (SGPT) 12/05/2019 37  13 - 56 U/L Final    AST (SGOT) 12/05/2019 21  10 - 38 U/L Final    Alk. phosphatase 12/05/2019 64  45 - 117 U/L Final    Protein, total 12/05/2019 7.2  6.4 - 8.2 g/dL Final    Albumin 12/05/2019 4.1  3.4 - 5.0 g/dL Final    Globulin 12/05/2019 3.1  2.0 - 4.0 g/dL Final    A-G Ratio 12/05/2019 1.3  0.8 - 1.7   Final          Follow-up and Dispositions    · Return in about 2 months (around 2/5/2020).

## 2019-12-05 NOTE — PROGRESS NOTES
Millie Guevara presents today for   Chief Complaint   Patient presents with    Pre-op Exam     carpal tunnel release left side with Dr. Elif Lam on 12-13-19       Is someone accompanying this pt? no    Is the patient using any DME equipment during [address]? no    Depression Screening:  3 most recent PHQ Screens 10/28/2019   Little interest or pleasure in doing things Not at all   Feeling down, depressed, irritable, or hopeless Not at all   Total Score PHQ 2 0       Learning Assessment:  Learning Assessment 10/28/2019   PRIMARY LEARNER Patient   PRIMARY LANGUAGE ENGLISH   LEARNER PREFERENCE PRIMARY PICTURES     LISTENING     READING     DEMONSTRATION     VIDEOS   ANSWERED BY patient   RELATIONSHIP SELF       Abuse Screening:  Abuse Screening Questionnaire 10/28/2019   Do you ever feel afraid of your partner? N   Are you in a relationship with someone who physically or mentally threatens you? N   Is it safe for you to go home? Y       Fall Risk  Fall Risk Assessment, last 12 mths 10/28/2019   Able to walk? Yes   Fall in past 12 months? No       Health Maintenance reviewed and discussed and ordered per Provider. Health Maintenance Due   Topic Date Due    Pneumococcal 0-64 years (1 of 1 - PPSV23) 09/10/1981    DTaP/Tdap/Td series (1 - Tdap) 09/10/1986    PAP AKA CERVICAL CYTOLOGY  09/10/1996   . Patient had pap smear recently along with lab work at [address]. Patient signed COLLIN last visit. We have not gotten records as of yet. Pt currently taking Antiplatelet therapy? no    Coordination of Care:  1. Have you been to the ER, urgent care clinic since your last visit? Hospitalized since your last visit? no    2. Have you seen or consulted any other health care providers outside of the [address] [place] [address] since your last visit? Include any pap smears or colon screening.  no

## 2019-12-11 ENCOUNTER — HOSPITAL ENCOUNTER (OUTPATIENT)
Dept: NUTRITION | Age: 44
Discharge: HOME | End: 2019-12-11
Payer: COMMERCIAL

## 2019-12-11 PROCEDURE — 97802 MEDICAL NUTRITION INDIV IN: CPT

## 2019-12-11 NOTE — PROGRESS NOTES
[facility] AT [facility] - Outpatient Nutrition Services  [address] [place] [address] Maddie Santillan [address], [address]  Phone: [phone] Fax: [phone]   Nutrition Assessment  Medical Nutrition Therapy   Outpatient Initial Evaluation         Patient Name: Andrey Jackson : 1975   Treatment Diagnosis: Obesity; HTN   Referral Source: Diane Cano MD Start of Care [facility]): 2019     Gender: female Age: 40 y.o. Ht: 68 In Wt: 213.6 lb  kg   BMI: 32.5 BMR   Male  BMR Female [id number]     Past Medical History:  n/a     Pertinent Medications:   Lyrica     Biochemical Data:   No results found for: HBA1C, HGBE8, SLM2NAPZ, OBV0GKEQ  Lab Results   Component Value Date/Time    Sodium 138 2019 01:36 PM    Potassium 4.0 2019 01:36 PM    Chloride 106 2019 01:36 PM    CO2 27 2019 01:36 PM    Anion gap 5 2019 01:36 PM    Glucose 79 2019 01:36 PM    BUN 11 2019 01:36 PM    Creatinine 0.63 2019 01:36 PM    BUN/Creatinine ratio 17 2019 01:36 PM    GFR est AA >60 2019 01:36 PM    GFR est non-AA >60 2019 01:36 PM    Calcium 9.0 2019 01:36 PM    Bilirubin, total 0.3 2019 01:36 PM    AST (SGOT) 21 2019 01:36 PM    Alk. phosphatase 64 2019 01:36 PM    Protein, total 7.2 2019 01:36 PM    Albumin 4.1 2019 01:36 PM    Globulin 3.1 2019 01:36 PM    A-G Ratio 1.3 2019 01:36 PM    ALT (SGPT) 37 2019 01:36 PM     No results found for: CHOL, CHOLPOCT, CHOLX, CHLST, CHOLV, HDL, HDLPOC, HDLP, LDL, LDLCPOC, LDLC, DLDLP, VLDLC, VLDL, TGLX, TRIGL, TRIGP, TGLPOCT, CHHD, CHHDX  Lab Results   Component Value Date/Time    ALT (SGPT) 37 2019 01:36 PM    AST (SGOT) 21 2019 01:36 PM    Alk.  phosphatase 64 2019 01:36 PM    Bilirubin, total 0.3 2019 01:36 PM     Lab Results   Component Value Date/Time    Creatinine 0.63 2019 01:36 PM     Lab Results   Component Value Date/Time    BUN 11 2019 01:36 PM No results found for: MCACR, MCA1, MCA2, MCA3, MCAU, MCAU2, MCALPOCT     Assessment:    Pt referred to nutrition counseling for assistance with fatigue, thinning hair and weight gain. Pt reports 60 lb weight gain over 9 years; 40 lbs over the past 3 years. Pt was successful losing weight with [employer], but could not maintain long term DT financial restrictions, and she regained all of the weight she lost.  Pt lives alone but her boyfriend often eats dinner and stays over with her. Pt c/o fatigue and not feeling happy in her clothes currently. She does not believe her boyfriend will be overly supportive of her efforts to eat healthier and lose weight, although he exercises regularly. Pt's activity level is limited DT nerve damage and pain (neck/carpal tunnel). She is scheduled to have carpal tunnel surgery this Friday. Pt agreed to all goals and recommendations provided today. Food & Nutrition: B- 3 empinadas from [employer] and diet coke  Sn- bowl of grits  L- Khushbu's  D- soup    Pt drinks 10+ shots on most Saturday and Sunday evenings each week. Pt stated she quit drinking beer, thinking it was contributing to her weight gain. Pt doesn't eat out (other than lunch daily) and is open to eating most foods, although she is slightly lactose intolerant. Estimate Needs   Calories: 1400  Protein: 105 Carbs: 140 Fat: 47   Kcal/day  g/day  g/day  g/day        percent: 30  40  30               Nutrition Diagnosis Nutrition Diagnosis: Obesity related to increased PO intake and lack of activity as evidenced by high BMI and unhealthy diet and exercise recall. Nutrition Intervention &  Education: Provided macronutrient education, including optimal times to eat throughout the day and appropriate balance of macronutrients to promote more efficient RMR. Discussed the importance of developing a consistent lifestyle, including eating habits for long term weight loss and management.   Provided pt with meal builder and diet plan. Handouts Provided: []  Carbohydrates  []  Protein  []  Non-starchy Vegatbles  []  Food Label  []  Meal and Snack Ideas  []  Food Journals []  Diabetes  []  Cholesterol  []  Sodium  [x]  Meal Builder  [x]  Diet Plan  [x]  Others: fast food guide   Information Reviewed with: pt   Readiness to Change Stage: []  Pre-contemplative    []  Contemplative  [x]  Preparation               []  Action                  []  Maintenance   Potential Barriers to Learning: []  Decline in memory    []  Language barrier   []  Other:  []  Emotional                  []  Limited mobility  []  Lack of motivation     [] Vision, hearing or cognitive impairment   Expected Compliance:  Fair due to lack of support from boyfriend. Nutritional Goal - To promote lifestyle changes to result in:    [x]  Weight loss  []  Improved diabetic control  []  Decreased cholesterol levels  [x]  Decreased blood pressure  []  Weight maintenance []  Preventing any interactions associated with food allergies  []  Adequate weight gain toward goal weight  []  Other:        Patient Goals:   1. Balance carb and protein at every meal/snack  2. Eat 2-3 hrs after snacks and 4-5 hrs after meals  3.  Limit alcohol consumption to either Saturday OR Sunday     Dietitian Signature: Mae Ulrich MS, RD Date: 12/11/2019   Follow-up: 1/16 at 2:30 Time: 11:29 AM

## 2019-12-12 DIAGNOSIS — I10 ESSENTIAL HYPERTENSION: ICD-10-CM

## 2019-12-12 RX ORDER — DILTIAZEM HYDROCHLORIDE 120 MG/1
CAPSULE, COATED, EXTENDED RELEASE ORAL
Qty: 30 CAP | Refills: 0 | Status: SHIPPED | OUTPATIENT
Start: 2019-12-12 | End: 2020-01-07

## 2019-12-12 RX ORDER — HYDROCHLOROTHIAZIDE 12.5 MG/1
TABLET ORAL
Qty: 90 TAB | Refills: 0 | Status: SHIPPED | OUTPATIENT
Start: 2019-12-12 | End: 2019-12-13 | Stop reason: SDUPTHER

## 2019-12-13 DIAGNOSIS — I10 ESSENTIAL HYPERTENSION: ICD-10-CM

## 2019-12-13 RX ORDER — HYDROCHLOROTHIAZIDE 12.5 MG/1
TABLET ORAL
Qty: 90 TAB | Refills: 0 | Status: SHIPPED | OUTPATIENT
Start: 2019-12-13 | End: 2020-01-21

## 2019-12-13 NOTE — TELEPHONE ENCOUNTER
Requested Prescriptions     Pending Prescriptions Disp Refills    hydroCHLOROthiazide (HYDRODIURIL) 12.5 mg tablet 90 Tab 0     Previous Rx from 12/13/19 was not received by pharmacy    Please resend

## 2020-01-07 DIAGNOSIS — I10 ESSENTIAL HYPERTENSION: ICD-10-CM

## 2020-01-07 RX ORDER — DILTIAZEM HYDROCHLORIDE 120 MG/1
CAPSULE, COATED, EXTENDED RELEASE ORAL
Qty: 30 CAP | Refills: 0 | Status: SHIPPED | OUTPATIENT
Start: 2020-01-07 | End: 2020-01-21 | Stop reason: SINTOL

## 2020-01-21 ENCOUNTER — OFFICE VISIT (OUTPATIENT)
Dept: FAMILY MEDICINE CLINIC | Age: 45
End: 2020-01-21

## 2020-01-21 VITALS
BODY MASS INDEX: 32.77 KG/M2 | OXYGEN SATURATION: 98 % | RESPIRATION RATE: 18 BRPM | HEIGHT: 68 IN | DIASTOLIC BLOOD PRESSURE: 95 MMHG | HEART RATE: 71 BPM | SYSTOLIC BLOOD PRESSURE: 152 MMHG | WEIGHT: 216.2 LBS | TEMPERATURE: 97.8 F

## 2020-01-21 DIAGNOSIS — F41.1 GAD (GENERALIZED ANXIETY DISORDER): ICD-10-CM

## 2020-01-21 DIAGNOSIS — G47.09 OTHER INSOMNIA: ICD-10-CM

## 2020-01-21 DIAGNOSIS — R22.42 LOCALIZED SWELLING OF LEFT LOWER LEG: ICD-10-CM

## 2020-01-21 DIAGNOSIS — E66.9 OBESITY (BMI 30.0-34.9): ICD-10-CM

## 2020-01-21 DIAGNOSIS — E78.2 MIXED HYPERLIPIDEMIA: ICD-10-CM

## 2020-01-21 DIAGNOSIS — I10 ESSENTIAL HYPERTENSION: Primary | ICD-10-CM

## 2020-01-21 RX ORDER — LISINOPRIL 20 MG/1
20 TABLET ORAL DAILY
Qty: 30 TAB | Refills: 2 | Status: SHIPPED | OUTPATIENT
Start: 2020-01-21 | End: 2020-04-16 | Stop reason: SDUPTHER

## 2020-01-21 RX ORDER — CHLORTHALIDONE 25 MG/1
25 TABLET ORAL DAILY
Qty: 30 TAB | Refills: 2 | Status: SHIPPED | OUTPATIENT
Start: 2020-01-21 | End: 2020-04-16 | Stop reason: SDUPTHER

## 2020-01-21 NOTE — PROGRESS NOTES
Alvin Medina     Chief Complaint   Patient presents with    Hypertension    Insomnia     Vitals:    01/21/20 1510   BP: (!) 152/95   Pulse: 71   Resp: 18   Temp: 97.8 °F (36.6 °C)   TempSrc: Oral   SpO2: 98%   Weight: 216 lb 3.2 oz (98.1 kg)   Height: 5' 8\" (1.727 m)   PainSc:   0 - No pain   LMP: 01/02/2020         HPI: Regan Lr is here today for follow-up, for hypertension, she think Cardizem is causing her to have insomnia despite that she takes it first thing in the morning, patient had prior insomnia but she thinks it got worse and she is just not able to sleep, also she has been having lower extremity edema and she is on hydrochlorothiazide. Patient does have anxiety which is getting worse, also patient might have suffered from PTSD, she had seen psychiatrist once and she had not like them she never went back, I recommend patient to consult with psychiatrist and consider therapy to help with an anxiety and that may be the reason for insomnia,    Past Medical History:   Diagnosis Date    Eczema      Past Surgical History:   Procedure Laterality Date    HX CARPAL TUNNEL RELEASE Left 12/13/2019    HX HIP REPLACEMENT Right 2005     Social History     Tobacco Use    Smoking status: Current Some Day Smoker    Smokeless tobacco: Never Used   Substance Use Topics    Alcohol use: Yes       Family History   Problem Relation Age of Onset    Hypertension Mother     Diabetes Father     Hypertension Father     Breast Cancer Maternal Aunt        Review of Systems   Constitutional: Negative for chills, fever, malaise/fatigue and weight loss. HENT: Negative for congestion, ear discharge, ear pain, hearing loss and nosebleeds. Eyes: Negative for blurred vision, double vision and discharge. Respiratory: Negative for cough. Cardiovascular: Positive for leg swelling. Negative for chest pain, palpitations and claudication.    Gastrointestinal: Negative for abdominal pain, constipation, diarrhea, nausea and vomiting. Genitourinary: Negative for dysuria, frequency and urgency. Musculoskeletal: Negative for myalgias. Skin: Negative for itching and rash. Neurological: Positive for tingling and sensory change. Negative for dizziness, speech change, focal weakness, weakness and headaches. Left wrist tingling sensation   Psychiatric/Behavioral: Negative for depression, substance abuse and suicidal ideas. The patient is nervous/anxious and has insomnia. Physical Exam  Constitutional:       General: She is not in acute distress. Appearance: Normal appearance. She is well-developed. She is not diaphoretic. HENT:      Head: Normocephalic and atraumatic. Mouth/Throat:      Pharynx: No oropharyngeal exudate. Eyes:      General: No scleral icterus. Right eye: No discharge. Left eye: No discharge. Conjunctiva/sclera: Conjunctivae normal.      Pupils: Pupils are equal, round, and reactive to light. Neck:      Thyroid: No thyromegaly. Cardiovascular:      Rate and Rhythm: Normal rate and regular rhythm. Heart sounds: Normal heart sounds. No murmur. Pulmonary:      Effort: Pulmonary effort is normal. No respiratory distress. Breath sounds: Normal breath sounds. No wheezing or rales. Chest:      Chest wall: No tenderness. Abdominal:      General: There is no distension. Palpations: Abdomen is soft. Tenderness: There is no tenderness. There is no rebound. Musculoskeletal: Normal range of motion. General: No tenderness or deformity. Right lower leg: Edema present. Left lower leg: Edema present. Comments: Mild pitting edema bilaterally   Lymphadenopathy:      Cervical: No cervical adenopathy. Skin:     General: Skin is warm and dry. Coloration: Skin is not pale. Findings: No erythema or rash. Neurological:      Mental Status: She is alert and oriented to person, place, and time.       Cranial Nerves: No cranial nerve deficit. Coordination: Coordination normal.   Psychiatric:         Behavior: Behavior normal.         Thought Content: Thought content normal.         Judgment: Judgment normal.          Assessment and plan     Plan of care has been discussed with the patient, he agrees to the plan and verbalized understanding. All his questions were answered  More than 50% of the time spent in this visit was counseling the patient about  illness and treatment options         1. Essential hypertension    I have advised the patient to stop cortisone first and start taking lisinopril  But then after a few days stop hydrochlorothiazide and start taking  Chlorothiazide  - chlorthalidone (HYGROTEN) 25 mg tablet; Take 1 Tab by mouth daily. Dispense: 30 Tab; Refill: 2  - lisinopril (PRINIVIL, ZESTRIL) 20 mg tablet; Take 1 Tab by mouth daily. Dispense: 30 Tab; Refill: 2    2. Other insomnia  Possibly secondary to anxiety    Advised patient to see counselor and psychiatrist    Also I offered her starting her on SSRIs or anxiety medication she has declined it at this time    3. Mixed hyperlipidemia  Not in any statin at this time    4. Obesity (BMI 30.0-34.9)      5. SAPAN (generalized anxiety disorder)    Advised patient to see counselor and psychiatrist    Also I offered her starting her on SSRIs or anxiety medication she has declined it at this time      6. Localized swelling of left lower leg    - chlorthalidone (HYGROTEN) 25 mg tablet; Take 1 Tab by mouth daily. Dispense: 30 Tab; Refill: 2    Current Outpatient Medications   Medication Sig Dispense Refill    chlorthalidone (HYGROTEN) 25 mg tablet Take 1 Tab by mouth daily. 30 Tab 2    lisinopril (PRINIVIL, ZESTRIL) 20 mg tablet Take 1 Tab by mouth daily. 30 Tab 2    fluticasone propionate (FLONASE) 50 mcg/actuation nasal spray 2 Sprays by Both Nostrils route daily. 1 Bottle 5    ketoconazole (NIZORAL) 2 % shampoo Apply 1 mL to affected area as needed.   3    triamcinolone acetonide (KENALOG) 0.025 % topical cream TRACEY AA OF LEGS AND BREAST BID FOR 2 WEEKS FOR ECZEMA  2    betamethasone dipropionate (DIPROSONE) 0.05 % topical cream Apply 1 g to affected area two (2) times a day. 15 g 5    montelukast (SINGULAIR) 10 mg tablet Take 10 mg by mouth.  pregabalin (LYRICA) 100 mg capsule TK ONE C PO ONCE A DAY IN THE MORNING AND 2 CS Q NIGHT  4    Pamabrom (DIURETIC SOFTGELS) 50 mg cap Take 1 Cap by mouth daily. There are no active problems to display for this patient. Results for orders placed or performed during the hospital encounter of 12/05/19   CBC WITH AUTOMATED DIFF   Result Value Ref Range    WBC 7.5 4.6 - 13.2 K/uL    RBC 4.18 (L) 4.20 - 5.30 M/uL    HGB 13.4 12.0 - 16.0 g/dL    HCT 40.7 35.0 - 45.0 %    MCV 97.4 (H) 74.0 - 97.0 FL    MCH 32.1 24.0 - 34.0 PG    MCHC 32.9 31.0 - 37.0 g/dL    RDW 13.1 11.6 - 14.5 %    PLATELET 603 118 - 729 K/uL    MPV 10.3 9.2 - 11.8 FL    NEUTROPHILS 59 40 - 73 %    LYMPHOCYTES 30 21 - 52 %    MONOCYTES 8 3 - 10 %    EOSINOPHILS 3 0 - 5 %    BASOPHILS 0 0 - 2 %    ABS. NEUTROPHILS 4.4 1.8 - 8.0 K/UL    ABS. LYMPHOCYTES 2.2 0.9 - 3.6 K/UL    ABS. MONOCYTES 0.6 0.05 - 1.2 K/UL    ABS. EOSINOPHILS 0.2 0.0 - 0.4 K/UL    ABS. BASOPHILS 0.0 0.0 - 0.1 K/UL    DF AUTOMATED     METABOLIC PANEL, COMPREHENSIVE   Result Value Ref Range    Sodium 138 136 - 145 mmol/L    Potassium 4.0 3.5 - 5.5 mmol/L    Chloride 106 100 - 111 mmol/L    CO2 27 21 - 32 mmol/L    Anion gap 5 3.0 - 18 mmol/L    Glucose 79 74 - 99 mg/dL    BUN 11 7.0 - 18 MG/DL    Creatinine 0.63 0.6 - 1.3 MG/DL    BUN/Creatinine ratio 17 12 - 20      GFR est AA >60 >60 ml/min/1.73m2    GFR est non-AA >60 >60 ml/min/1.73m2    Calcium 9.0 8.5 - 10.1 MG/DL    Bilirubin, total 0.3 0.2 - 1.0 MG/DL    ALT (SGPT) 37 13 - 56 U/L    AST (SGOT) 21 10 - 38 U/L    Alk.  phosphatase 64 45 - 117 U/L    Protein, total 7.2 6.4 - 8.2 g/dL    Albumin 4.1 3.4 - 5.0 g/dL    Globulin 3.1 2.0 - 4.0 g/dL A-G Ratio 1.3 0.8 - 1.7       Hospital Outpatient Visit on 12/05/2019   Component Date Value Ref Range Status    WBC 12/05/2019 7.5  4.6 - 13.2 K/uL Final    RBC 12/05/2019 4.18* 4.20 - 5.30 M/uL Final    HGB 12/05/2019 13.4  12.0 - 16.0 g/dL Final    HCT 12/05/2019 40.7  35.0 - 45.0 % Final    MCV 12/05/2019 97.4* 74.0 - 97.0 FL Final    MCH 12/05/2019 32.1  24.0 - 34.0 PG Final    MCHC 12/05/2019 32.9  31.0 - 37.0 g/dL Final    RDW 12/05/2019 13.1  11.6 - 14.5 % Final    PLATELET 04/08/1339 110  135 - 420 K/uL Final    MPV 12/05/2019 10.3  9.2 - 11.8 FL Final    NEUTROPHILS 12/05/2019 59  40 - 73 % Final    LYMPHOCYTES 12/05/2019 30  21 - 52 % Final    MONOCYTES 12/05/2019 8  3 - 10 % Final    EOSINOPHILS 12/05/2019 3  0 - 5 % Final    BASOPHILS 12/05/2019 0  0 - 2 % Final    ABS. NEUTROPHILS 12/05/2019 4.4  1.8 - 8.0 K/UL Final    ABS. LYMPHOCYTES 12/05/2019 2.2  0.9 - 3.6 K/UL Final    ABS. MONOCYTES 12/05/2019 0.6  0.05 - 1.2 K/UL Final    ABS. EOSINOPHILS 12/05/2019 0.2  0.0 - 0.4 K/UL Final    ABS. BASOPHILS 12/05/2019 0.0  0.0 - 0.1 K/UL Final    DF 12/05/2019 AUTOMATED    Final    Sodium 12/05/2019 138  136 - 145 mmol/L Final    Potassium 12/05/2019 4.0  3.5 - 5.5 mmol/L Final    Chloride 12/05/2019 106  100 - 111 mmol/L Final    CO2 12/05/2019 27  21 - 32 mmol/L Final    Anion gap 12/05/2019 5  3.0 - 18 mmol/L Final    Glucose 12/05/2019 79  74 - 99 mg/dL Final    BUN 12/05/2019 11  7.0 - 18 MG/DL Final    Creatinine 12/05/2019 0.63  0.6 - 1.3 MG/DL Final    BUN/Creatinine ratio 12/05/2019 17  12 - 20   Final    GFR est AA 12/05/2019 >60  >60 ml/min/1.73m2 Final    GFR est non-AA 12/05/2019 >60  >60 ml/min/1.73m2 Final    Comment: (NOTE)  Estimated GFR is calculated using the Modification of Diet in Renal   Disease (MDRD) Study equation, reported for both  Americans   (GFRAA) and non- Americans (GFRNA), and normalized to 1.73m2   body surface area.  The physician must decide which value applies to   the patient. The MDRD study equation should only be used in   individuals age 25 or older. It has not been validated for the   following: pregnant women, patients with serious comorbid conditions,   or on certain medications, or persons with extremes of body size,   muscle mass, or nutritional status.  Calcium 12/05/2019 9.0  8.5 - 10.1 MG/DL Final    Bilirubin, total 12/05/2019 0.3  0.2 - 1.0 MG/DL Final    ALT (SGPT) 12/05/2019 37  13 - 56 U/L Final    AST (SGOT) 12/05/2019 21  10 - 38 U/L Final    Alk. phosphatase 12/05/2019 64  45 - 117 U/L Final    Protein, total 12/05/2019 7.2  6.4 - 8.2 g/dL Final    Albumin 12/05/2019 4.1  3.4 - 5.0 g/dL Final    Globulin 12/05/2019 3.1  2.0 - 4.0 g/dL Final    A-G Ratio 12/05/2019 1.3  0.8 - 1.7   Final          Follow-up and Dispositions    · Return in about 1 month (around 2/21/2020).

## 2020-01-28 ENCOUNTER — TELEPHONE (OUTPATIENT)
Dept: FAMILY MEDICINE CLINIC | Age: 45
End: 2020-01-28

## 2020-01-29 ENCOUNTER — OFFICE VISIT (OUTPATIENT)
Dept: FAMILY MEDICINE CLINIC | Age: 45
End: 2020-01-29

## 2020-01-29 VITALS
SYSTOLIC BLOOD PRESSURE: 131 MMHG | TEMPERATURE: 97.9 F | OXYGEN SATURATION: 96 % | DIASTOLIC BLOOD PRESSURE: 88 MMHG | HEIGHT: 68 IN | BODY MASS INDEX: 30.92 KG/M2 | HEART RATE: 81 BPM | WEIGHT: 204 LBS | RESPIRATION RATE: 18 BRPM

## 2020-01-29 DIAGNOSIS — R19.7 DIARRHEA, UNSPECIFIED TYPE: ICD-10-CM

## 2020-01-29 DIAGNOSIS — R10.9 ABDOMINAL PAIN, UNSPECIFIED ABDOMINAL LOCATION: Primary | ICD-10-CM

## 2020-01-29 RX ORDER — LOPERAMIDE HCL 2 MG
2 TABLET ORAL
Qty: 20 TAB | Refills: 0 | Status: SHIPPED | OUTPATIENT
Start: 2020-01-29 | End: 2020-02-08

## 2020-01-29 RX ORDER — DICYCLOMINE HYDROCHLORIDE 10 MG/1
10 CAPSULE ORAL 3 TIMES DAILY
Qty: 20 CAP | Refills: 0 | Status: SHIPPED | OUTPATIENT
Start: 2020-01-29 | End: 2020-08-20

## 2020-01-29 NOTE — PROGRESS NOTES
Gracy Doe     Chief Complaint   Patient presents with    Diarrhea     Vitals:    01/29/20 0954   BP: 131/88   Pulse: 81   Resp: 18   Temp: 97.9 °F (36.6 °C)   TempSrc: Oral   SpO2: 96%   Weight: 204 lb (92.5 kg)   Height: 5' 8\" (1.727 m)   PainSc:   0 - No pain   LMP: 01/02/2020         HPI:Rebecca is here for abdominal pain nad dirrhea for 2 days , had  loose stool watery diarrhea multiple times the last 2 days, patient said she went about 30 times today, no vomiting    No fever no chills no skin rash, no chest pain or shortness of breath    Every time she drinks water she go straight to the bathroom      She thinks that the food that she ate at the [employer] or [employer] in the last 2 days      Nobody is sick at home      Patient was accompanied by her mother today    Past Medical History:   Diagnosis Date    Eczema      Past Surgical History:   Procedure Laterality Date    HX CARPAL TUNNEL RELEASE Left 12/13/2019    HX HIP REPLACEMENT Right 2005     Social History     Tobacco Use    Smoking status: Current Some Day Smoker    Smokeless tobacco: Never Used   Substance Use Topics    Alcohol use: Yes       Family History   Problem Relation Age of Onset    Hypertension Mother     Diabetes Father     Hypertension Father     Breast Cancer Maternal Aunt        Review of Systems   Constitutional: Negative for chills, fever, malaise/fatigue and weight loss. HENT: Negative for congestion, ear discharge, ear pain, hearing loss, nosebleeds, sinus pain and sore throat. Eyes: Negative for blurred vision, double vision and discharge. Respiratory: Negative for cough, hemoptysis, sputum production and shortness of breath. Cardiovascular: Negative for chest pain, palpitations, claudication and leg swelling. Gastrointestinal: Positive for abdominal pain, diarrhea and vomiting. Negative for blood in stool, constipation, heartburn, melena and nausea.    Genitourinary: Negative for dysuria, frequency and urgency. Musculoskeletal: Negative for back pain, joint pain, myalgias and neck pain. Skin: Negative for itching and rash. Neurological: Negative for dizziness, tingling, sensory change, speech change, focal weakness, weakness and headaches. Psychiatric/Behavioral: Negative for depression, hallucinations, substance abuse and suicidal ideas. The patient is nervous/anxious. Physical Exam  Vitals signs and nursing note reviewed. Exam conducted with a chaperone present. Constitutional:       General: She is not in acute distress. Appearance: She is well-developed. She is not diaphoretic. HENT:      Head: Normocephalic and atraumatic. Mouth/Throat:      Pharynx: No oropharyngeal exudate. Eyes:      General: No scleral icterus. Right eye: No discharge. Left eye: No discharge. Conjunctiva/sclera: Conjunctivae normal.      Pupils: Pupils are equal, round, and reactive to light. Neck:      Thyroid: No thyromegaly. Cardiovascular:      Rate and Rhythm: Normal rate and regular rhythm. Heart sounds: Normal heart sounds. No murmur. Pulmonary:      Effort: Pulmonary effort is normal. No respiratory distress. Breath sounds: Normal breath sounds. No wheezing or rales. Chest:      Chest wall: No tenderness. Abdominal:      General: Bowel sounds are normal. There is no distension. Palpations: Abdomen is soft. Tenderness: There is abdominal tenderness. There is no guarding or rebound. Comments: Mild generalized abdominal tenderness with no rebound   Musculoskeletal: Normal range of motion. General: No tenderness or deformity. Lymphadenopathy:      Cervical: No cervical adenopathy. Skin:     General: Skin is warm and dry. Coloration: Skin is not pale. Findings: No erythema or rash. Neurological:      Mental Status: She is alert and oriented to person, place, and time. Cranial Nerves: No cranial nerve deficit. Coordination: Coordination normal.   Psychiatric:         Behavior: Behavior normal.         Thought Content: Thought content normal.         Judgment: Judgment normal.          Assessment and plan     Plan of care has been discussed with the patient, he agrees to the plan and verbalized understanding. All his questions were answered  More than 50% of the time spent in this visit was counseling the patient about  illness and treatment options         1. Abdominal pain, unspecified abdominal location    I have advised patient if she is feeling dizzy, or lightheaded, or her symptoms getting worse she need to go to emergency room    She need to drink oral rehydration solutions, or Gatorade    - METABOLIC PANEL, COMPREHENSIVE; Future  - CBC WITH AUTOMATED DIFF; Future  - dicyclomine (BENTYL) 10 mg capsule; Take 1 Cap by mouth three (3) times daily. As needed for abdominal pain  Dispense: 20 Cap; Refill: 0  - loperamide (IMODIUM A-D) 2 mg tablet; Take 1 Tab by mouth four (4) times daily as needed for Diarrhea for up to 10 days. Dispense: 20 Tab; Refill: 0    2. Diarrhea, unspecified type    - METABOLIC PANEL, COMPREHENSIVE; Future  - CBC WITH AUTOMATED DIFF; Future  - dicyclomine (BENTYL) 10 mg capsule; Take 1 Cap by mouth three (3) times daily. As needed for abdominal pain  Dispense: 20 Cap; Refill: 0  - loperamide (IMODIUM A-D) 2 mg tablet; Take 1 Tab by mouth four (4) times daily as needed for Diarrhea for up to 10 days. Dispense: 20 Tab; Refill: 0    Current Outpatient Medications   Medication Sig Dispense Refill    dicyclomine (BENTYL) 10 mg capsule Take 1 Cap by mouth three (3) times daily. As needed for abdominal pain 20 Cap 0    loperamide (IMODIUM A-D) 2 mg tablet Take 1 Tab by mouth four (4) times daily as needed for Diarrhea for up to 10 days. 20 Tab 0    chlorthalidone (HYGROTEN) 25 mg tablet Take 1 Tab by mouth daily. 30 Tab 2    lisinopril (PRINIVIL, ZESTRIL) 20 mg tablet Take 1 Tab by mouth daily. 30 Tab 2    fluticasone propionate (FLONASE) 50 mcg/actuation nasal spray 2 Sprays by Both Nostrils route daily. 1 Bottle 5    ketoconazole (NIZORAL) 2 % shampoo Apply 1 mL to affected area as needed. 3    triamcinolone acetonide (KENALOG) 0.025 % topical cream TRACEY AA OF LEGS AND BREAST BID FOR 2 WEEKS FOR ECZEMA  2    betamethasone dipropionate (DIPROSONE) 0.05 % topical cream Apply 1 g to affected area two (2) times a day. 15 g 5    montelukast (SINGULAIR) 10 mg tablet Take 10 mg by mouth.  pregabalin (LYRICA) 100 mg capsule TK ONE C PO ONCE A DAY IN THE MORNING AND 2 CS Q NIGHT  4    Pamabrom (DIURETIC SOFTGELS) 50 mg cap Take 1 Cap by mouth daily. There are no active problems to display for this patient. Results for orders placed or performed during the hospital encounter of 12/05/19   CBC WITH AUTOMATED DIFF   Result Value Ref Range    WBC 7.5 4.6 - 13.2 K/uL    RBC 4.18 (L) 4.20 - 5.30 M/uL    HGB 13.4 12.0 - 16.0 g/dL    HCT 40.7 35.0 - 45.0 %    MCV 97.4 (H) 74.0 - 97.0 FL    MCH 32.1 24.0 - 34.0 PG    MCHC 32.9 31.0 - 37.0 g/dL    RDW 13.1 11.6 - 14.5 %    PLATELET 732 862 - 220 K/uL    MPV 10.3 9.2 - 11.8 FL    NEUTROPHILS 59 40 - 73 %    LYMPHOCYTES 30 21 - 52 %    MONOCYTES 8 3 - 10 %    EOSINOPHILS 3 0 - 5 %    BASOPHILS 0 0 - 2 %    ABS. NEUTROPHILS 4.4 1.8 - 8.0 K/UL    ABS. LYMPHOCYTES 2.2 0.9 - 3.6 K/UL    ABS. MONOCYTES 0.6 0.05 - 1.2 K/UL    ABS. EOSINOPHILS 0.2 0.0 - 0.4 K/UL    ABS.  BASOPHILS 0.0 0.0 - 0.1 K/UL    DF AUTOMATED     METABOLIC PANEL, COMPREHENSIVE   Result Value Ref Range    Sodium 138 136 - 145 mmol/L    Potassium 4.0 3.5 - 5.5 mmol/L    Chloride 106 100 - 111 mmol/L    CO2 27 21 - 32 mmol/L    Anion gap 5 3.0 - 18 mmol/L    Glucose 79 74 - 99 mg/dL    BUN 11 7.0 - 18 MG/DL    Creatinine 0.63 0.6 - 1.3 MG/DL    BUN/Creatinine ratio 17 12 - 20      GFR est AA >60 >60 ml/min/1.73m2    GFR est non-AA >60 >60 ml/min/1.73m2    Calcium 9.0 8.5 - 10.1 MG/DL Bilirubin, total 0.3 0.2 - 1.0 MG/DL    ALT (SGPT) 37 13 - 56 U/L    AST (SGOT) 21 10 - 38 U/L    Alk. phosphatase 64 45 - 117 U/L    Protein, total 7.2 6.4 - 8.2 g/dL    Albumin 4.1 3.4 - 5.0 g/dL    Globulin 3.1 2.0 - 4.0 g/dL    A-G Ratio 1.3 0.8 - 1.7       Hospital Outpatient Visit on 12/05/2019   Component Date Value Ref Range Status    WBC 12/05/2019 7.5  4.6 - 13.2 K/uL Final    RBC 12/05/2019 4.18* 4.20 - 5.30 M/uL Final    HGB 12/05/2019 13.4  12.0 - 16.0 g/dL Final    HCT 12/05/2019 40.7  35.0 - 45.0 % Final    MCV 12/05/2019 97.4* 74.0 - 97.0 FL Final    MCH 12/05/2019 32.1  24.0 - 34.0 PG Final    MCHC 12/05/2019 32.9  31.0 - 37.0 g/dL Final    RDW 12/05/2019 13.1  11.6 - 14.5 % Final    PLATELET 28/00/4539 548  135 - 420 K/uL Final    MPV 12/05/2019 10.3  9.2 - 11.8 FL Final    NEUTROPHILS 12/05/2019 59  40 - 73 % Final    LYMPHOCYTES 12/05/2019 30  21 - 52 % Final    MONOCYTES 12/05/2019 8  3 - 10 % Final    EOSINOPHILS 12/05/2019 3  0 - 5 % Final    BASOPHILS 12/05/2019 0  0 - 2 % Final    ABS. NEUTROPHILS 12/05/2019 4.4  1.8 - 8.0 K/UL Final    ABS. LYMPHOCYTES 12/05/2019 2.2  0.9 - 3.6 K/UL Final    ABS. MONOCYTES 12/05/2019 0.6  0.05 - 1.2 K/UL Final    ABS. EOSINOPHILS 12/05/2019 0.2  0.0 - 0.4 K/UL Final    ABS.  BASOPHILS 12/05/2019 0.0  0.0 - 0.1 K/UL Final    DF 12/05/2019 AUTOMATED    Final    Sodium 12/05/2019 138  136 - 145 mmol/L Final    Potassium 12/05/2019 4.0  3.5 - 5.5 mmol/L Final    Chloride 12/05/2019 106  100 - 111 mmol/L Final    CO2 12/05/2019 27  21 - 32 mmol/L Final    Anion gap 12/05/2019 5  3.0 - 18 mmol/L Final    Glucose 12/05/2019 79  74 - 99 mg/dL Final    BUN 12/05/2019 11  7.0 - 18 MG/DL Final    Creatinine 12/05/2019 0.63  0.6 - 1.3 MG/DL Final    BUN/Creatinine ratio 12/05/2019 17  12 - 20   Final    GFR est AA 12/05/2019 >60  >60 ml/min/1.73m2 Final    GFR est non-AA 12/05/2019 >60  >60 ml/min/1.73m2 Final    Comment: (NOTE)  Estimated GFR is calculated using the Modification of Diet in Renal   Disease (MDRD) Study equation, reported for both  Americans   (GFRAA) and non- Americans (GFRNA), and normalized to 1.73m2   body surface area. The physician must decide which value applies to   the patient. The MDRD study equation should only be used in   individuals age 25 or older. It has not been validated for the   following: pregnant women, patients with serious comorbid conditions,   or on certain medications, or persons with extremes of body size,   muscle mass, or nutritional status.  Calcium 12/05/2019 9.0  8.5 - 10.1 MG/DL Final    Bilirubin, total 12/05/2019 0.3  0.2 - 1.0 MG/DL Final    ALT (SGPT) 12/05/2019 37  13 - 56 U/L Final    AST (SGOT) 12/05/2019 21  10 - 38 U/L Final    Alk. phosphatase 12/05/2019 64  45 - 117 U/L Final    Protein, total 12/05/2019 7.2  6.4 - 8.2 g/dL Final    Albumin 12/05/2019 4.1  3.4 - 5.0 g/dL Final    Globulin 12/05/2019 3.1  2.0 - 4.0 g/dL Final    A-G Ratio 12/05/2019 1.3  0.8 - 1.7   Final          Follow-up and Dispositions    · Return if symptoms worsen or fail to improve, for as per results, note for work 2 days .

## 2020-01-29 NOTE — PROGRESS NOTES
Hermon Hodgkins presents today for   Chief Complaint   Patient presents with    Diarrhea       Is someone accompanying this pt? no    Is the patient using any DME equipment during OV? no    Depression Screening:  3 most recent PHQ Screens 10/28/2019   Little interest or pleasure in doing things Not at all   Feeling down, depressed, irritable, or hopeless Not at all   Total Score PHQ 2 0       Learning Assessment:  Learning Assessment 10/28/2019   PRIMARY LEARNER Patient   PRIMARY LANGUAGE ENGLISH   LEARNER PREFERENCE PRIMARY PICTURES     LISTENING     READING     DEMONSTRATION     VIDEOS   ANSWERED BY patient   RELATIONSHIP SELF       Abuse Screening:  Abuse Screening Questionnaire 10/28/2019   Do you ever feel afraid of your partner? N   Are you in a relationship with someone who physically or mentally threatens you? N   Is it safe for you to go home? Y       Fall Risk  Fall Risk Assessment, last 12 mths 10/28/2019   Able to walk? Yes   Fall in past 12 months? No       Health Maintenance reviewed and discussed and ordered per Provider. Health Maintenance Due   Topic Date Due    Pneumococcal 0-64 years (1 of 1 - PPSV23) 09/10/1981    DTaP/Tdap/Td series (1 - Tdap) 09/10/1986    PAP AKA CERVICAL CYTOLOGY  09/10/1996   . Pt currently taking Antiplatelet therapy? no    Coordination of Care:  1. Have you been to the ER, urgent care clinic since your last visit? Hospitalized since your last visit? no    2. Have you seen or consulted any other health care providers outside of the [address] [place] [address] since your last visit? Include any pap smears or colon screening.  no

## 2020-01-29 NOTE — LETTER
NOTIFICATION RETURN TO WORK / SCHOOL 
 
1/29/2020 10:40 AM 
 
Ms. Faina Diehl [address] [address] To Whom It May Concern: 
 
Faina Diehl is currently under the care of [address] [place] [address]. She will return to work/school on: 1/31/2020 If there are questions or concerns please have the patient contact our office. Sincerely, Isai Canales MD

## 2020-02-03 ENCOUNTER — HOSPITAL ENCOUNTER (OUTPATIENT)
Dept: NUTRITION | Age: 45
Discharge: HOME | End: 2020-02-03
Payer: COMMERCIAL

## 2020-02-03 PROCEDURE — 97803 MED NUTRITION INDIV SUBSEQ: CPT

## 2020-02-03 NOTE — PROGRESS NOTES
NUTRITION  FOLLOW-UP TREATMENT NOTE  Patient Name: Ata Brenner         Date: 2/3/2020  : 1975    YES/NO Patient  Verified  Diagnosis: obesity   In time:   36             Out time:   1200   Total Treatment Time (min):   30     SUBJECTIVE/ASSESSMENT    Current Wt: 209 Previous Wt: 213.6 Wt Change: -4.6     Initial Wt:  Total Wt change:        Changes in medication or medical history? Any new allergies, surgeries or procedures? YES    If yes, update Summary List   Pt now taking lisinopril to manage HTN. Nutrition Diagnosis        Diagnosis Status: Nutrition Diagnosis: Obesity related to increased PO intake and lack of activity as evidenced by high BMI and unhealthy diet and exercise recall. []  Improved [x]  No Change    []  Declined        Nutrition Monitoring and Evaluation: Pt stated she has cut back on her intake of alcohol. She now drinks only 1/wk, but still consumes 4-5 drinks at one time (vs 10 drinks multiple days/wk). Pt admitted she did not incorporate the recommendations and diet prescription until after the holidays, officially starting . For the past 2 weeks, she has been sick with food poisoning and a head cold and has not consumed as much. Pt reports feeling full at lunch and not able to eat her afternoon snack. She mostly balances carb and protein throughout the day. Nutrition Prescription and  Intervention Weight loss and weight maintenance therapy should be based on a comprehensive weight management program including diet, physical activity, and behavior therapy. The combination therapy is more successful than using any one intervention alone. Individualized goals of weight loss therapy should be to reduce body weight at an optimal rate of 1 to 2 lbs per week for the first 6 months and to achieve an initial weight loss goal of up to 10% from baseline. These goals are realistic, achievable, and sustainable.        Patient Education:  [x]  Review current plan with patient   []  Other:    Handouts/  Information Provided: []  Carbohydrates  []  Protein  []  Fiber  []  Serving Sizes  []  Fluids  []  General guidelines []  Diabetes  []  Cholesterol  []  Sodium  []  SBGM  []  Food Journals  []  Others:        Patient Goals 1. Push afternoon snack portions into dinner  2. Meet carb budget at each scheduled eating time  3. Always balance protein and carbohydrate     PLAN    [x]  Continue on current plan []  Follow-up PRN   []  Discharge due to :    [x]  Next appt:  Mar 16 at 9:00     Dietitian: Eulalia New MS, RD    Date: 2/3/2020 Time: 12:12 PM

## 2020-03-16 ENCOUNTER — APPOINTMENT (OUTPATIENT)
Dept: NUTRITION | Age: 45
End: 2020-03-16

## 2020-04-16 DIAGNOSIS — I10 ESSENTIAL HYPERTENSION: ICD-10-CM

## 2020-04-16 DIAGNOSIS — R22.42 LOCALIZED SWELLING OF LEFT LOWER LEG: ICD-10-CM

## 2020-04-16 RX ORDER — LISINOPRIL 20 MG/1
20 TABLET ORAL DAILY
Qty: 30 TAB | Refills: 2 | Status: SHIPPED | OUTPATIENT
Start: 2020-04-16 | End: 2020-04-21 | Stop reason: SINTOL

## 2020-04-16 RX ORDER — CHLORTHALIDONE 25 MG/1
25 TABLET ORAL DAILY
Qty: 30 TAB | Refills: 2 | Status: SHIPPED | OUTPATIENT
Start: 2020-04-16 | End: 2020-06-18 | Stop reason: SDUPTHER

## 2020-04-21 ENCOUNTER — TELEPHONE (OUTPATIENT)
Dept: FAMILY MEDICINE CLINIC | Age: 45
End: 2020-04-21

## 2020-04-21 DIAGNOSIS — I10 ESSENTIAL HYPERTENSION: Primary | ICD-10-CM

## 2020-04-21 RX ORDER — LOSARTAN POTASSIUM 50 MG/1
50 TABLET ORAL DAILY
Qty: 90 TAB | Refills: 0 | Status: SHIPPED | OUTPATIENT
Start: 2020-04-21 | End: 2020-06-18 | Stop reason: SDUPTHER

## 2020-04-21 NOTE — TELEPHONE ENCOUNTER
Regarding: Prescription Question  Contact: [phone]  ----- Message from Farrah Pastrana LPN sent at 2/75/4021  2:34 PM EDT -----       ----- Message from Severo Palafox to Anupam Goyal MD sent at 4/16/2020  2:32 PM -----   Doctor Magdy Quinonez-    I hope you are doing well. Since I started taking Linsopril, I have had a cough. I first thought it was allergies, but I have had the cough since the first week of Feb. Which I believe is when I started taking the medicine. I read in the side effects section, that this medicine can cause a cough. Do you have any suggestions, given this day and age coughing is a sin and I am going hoarse from coughing, thus losing my voice.    Thank you very much for all you do and this time of need,  Mark Giraldo

## 2020-06-18 DIAGNOSIS — I10 ESSENTIAL HYPERTENSION: ICD-10-CM

## 2020-06-18 DIAGNOSIS — R22.42 LOCALIZED SWELLING OF LEFT LOWER LEG: ICD-10-CM

## 2020-06-18 NOTE — TELEPHONE ENCOUNTER
Last visit: 1/29/2020  Next visit: not scheduled  Last filled:    4/21/2020; cozaar 50 mg tab; qty 90 w/no refills  4/16/2020; hygroten 25 mg tab; qty 30 w/2 refills

## 2020-06-20 DIAGNOSIS — I10 ESSENTIAL HYPERTENSION: ICD-10-CM

## 2020-06-20 RX ORDER — LOSARTAN POTASSIUM 50 MG/1
50 TABLET ORAL DAILY
Qty: 30 TAB | Refills: 0 | Status: CANCELLED | OUTPATIENT
Start: 2020-06-20

## 2020-06-20 RX ORDER — LOSARTAN POTASSIUM 50 MG/1
50 TABLET ORAL DAILY
Qty: 30 TAB | Refills: 0 | Status: SHIPPED | OUTPATIENT
Start: 2020-06-20 | End: 2020-08-20 | Stop reason: SDUPTHER

## 2020-06-20 RX ORDER — CHLORTHALIDONE 25 MG/1
25 TABLET ORAL DAILY
Qty: 30 TAB | Refills: 0 | Status: SHIPPED | OUTPATIENT
Start: 2020-06-20 | End: 2020-08-20 | Stop reason: SDUPTHER

## 2020-08-20 ENCOUNTER — VIRTUAL VISIT (OUTPATIENT)
Dept: FAMILY MEDICINE CLINIC | Age: 45
End: 2020-08-20

## 2020-08-20 DIAGNOSIS — E66.9 OBESITY (BMI 30.0-34.9): ICD-10-CM

## 2020-08-20 DIAGNOSIS — J30.9 ALLERGIC RHINITIS, UNSPECIFIED SEASONALITY, UNSPECIFIED TRIGGER: ICD-10-CM

## 2020-08-20 DIAGNOSIS — L30.9 ECZEMA, UNSPECIFIED TYPE: ICD-10-CM

## 2020-08-20 DIAGNOSIS — M50.30 DDD (DEGENERATIVE DISC DISEASE), CERVICAL: ICD-10-CM

## 2020-08-20 DIAGNOSIS — I10 ESSENTIAL HYPERTENSION: ICD-10-CM

## 2020-08-20 DIAGNOSIS — R22.42 LOCALIZED SWELLING OF LEFT LOWER LEG: ICD-10-CM

## 2020-08-20 DIAGNOSIS — E78.2 MIXED HYPERLIPIDEMIA: Primary | ICD-10-CM

## 2020-08-20 RX ORDER — BETAMETHASONE DIPROPIONATE 0.5 MG/G
1 CREAM TOPICAL 2 TIMES DAILY
Qty: 15 G | Refills: 5 | Status: SHIPPED | OUTPATIENT
Start: 2020-08-20 | End: 2021-04-15 | Stop reason: SDUPTHER

## 2020-08-20 RX ORDER — LOSARTAN POTASSIUM 50 MG/1
50 TABLET ORAL DAILY
Qty: 90 TAB | Refills: 0 | Status: SHIPPED | OUTPATIENT
Start: 2020-08-20 | End: 2021-01-22

## 2020-08-20 RX ORDER — FLUTICASONE PROPIONATE 50 MCG
2 SPRAY, SUSPENSION (ML) NASAL DAILY
Qty: 1 BOTTLE | Refills: 5 | Status: SHIPPED | OUTPATIENT
Start: 2020-08-20

## 2020-08-20 RX ORDER — CHLORTHALIDONE 25 MG/1
25 TABLET ORAL DAILY
Qty: 90 TAB | Refills: 0 | Status: SHIPPED | OUTPATIENT
Start: 2020-08-20 | End: 2021-02-18 | Stop reason: SDUPTHER

## 2020-08-20 NOTE — PROGRESS NOTES
Shabana Martinez is a 40 y.o. female who was seen by synchronous (real-time) audio-video technology on 8/20/2020 for Follow Up Chronic Condition     Patient is here for follow-up she has no acute complaints. She has been doing well adherent to all medications    Patient has been updated and reviewed patient is due for mammogram and Pap smear she will schedule a wellness visit        Assessment & Plan:   Diagnoses and all orders for this visit:    1. Mixed hyperlipidemia    Patient is not on a statin to evaluate lipid profile  -     LIPID PANEL; Future    2. Essential hypertension  Well-controlled on current medications  -     chlorthalidone (HYGROTEN) 25 mg tablet; Take 1 Tab by mouth daily. -     losartan (COZAAR) 50 mg tablet; Take 1 Tab by mouth daily.  -     METABOLIC PANEL, COMPREHENSIVE; Future  -     LIPID PANEL; Future    3. Localized swelling of left lower leg  -     chlorthalidone (HYGROTEN) 25 mg tablet; Take 1 Tab by mouth daily. Lower leg swelling is improved on  Diuretics    4. Allergic rhinitis, unspecified seasonality, unspecified trigger    Stable on fluticasone as needed  She is no longer taking Singulair    -     fluticasone propionate (FLONASE) 50 mcg/actuation nasal spray; 2 Sprays by Both Nostrils route daily. 5. Eczema, unspecified type     she is using this cream only as needed      -     betamethasone dipropionate (DIPROSONE) 0.05 % topical cream; Apply 1 g to affected area two (2) times a day. 6. Obesity (BMI 30.0-34.9)     No significant weight change reemphasized lifestyle modification  7. DDD (degenerative disc disease), cervical    Patient is not taking Lyrica due to possible weight gain her pain is manageable      712           Prior to Admission medications    Medication Sig Start Date End Date Taking? Authorizing Provider   chlorthalidone (HYGROTEN) 25 mg tablet Take 1 Tab by mouth daily.  8/20/20  Yes Jere Lacey MD   losartan (COZAAR) 50 mg tablet Take 1 Tab by mouth daily. 8/20/20  Yes Adela Barker MD   fluticasone propionate (FLONASE) 50 mcg/actuation nasal spray 2 Sprays by Both Nostrils route daily. 8/20/20  Yes Adela Barker MD   betamethasone dipropionate (DIPROSONE) 0.05 % topical cream Apply 1 g to affected area two (2) times a day. 8/20/20  Yes Adela Barker MD   chlorthalidone (HYGROTEN) 25 mg tablet Take 1 Tab by mouth daily. 6/20/20 8/20/20  Adela Barker MD   losartan (COZAAR) 50 mg tablet Take 1 Tab by mouth daily. 6/20/20 8/20/20  Adela Barker MD   dicyclomine (BENTYL) 10 mg capsule Take 1 Cap by mouth three (3) times daily. As needed for abdominal pain 1/29/20 8/20/20  Adela Barker MD   fluticasone propionate (FLONASE) 50 mcg/actuation nasal spray 2 Sprays by Both Nostrils route daily. 11/18/19 8/20/20  Adela Barker MD   ketoconazole (NIZORAL) 2 % shampoo Apply 1 mL to affected area as needed. 9/24/19 8/20/20  Provider, Historical   montelukast (SINGULAIR) 10 mg tablet Take 10 mg by mouth. 4/17/13 8/20/20  Provider, Historical   pregabalin (LYRICA) 100 mg capsule TK ONE C PO ONCE A DAY IN THE MORNING AND 2 CS Q NIGHT 10/22/19 8/20/20  Provider, Historical   triamcinolone acetonide (KENALOG) 0.025 % topical cream TRACEY AA OF LEGS AND BREAST BID FOR 2 WEEKS FOR ECZEMA 10/22/19 8/20/20  Provider, Historical   Pamabrom (DIURETIC SOFTGELS) 50 mg cap Take 1 Cap by mouth daily. 8/20/20  Provider, Historical   betamethasone dipropionate (DIPROSONE) 0.05 % topical cream Apply 1 g to affected area two (2) times a day. 10/28/19 8/20/20  Adela Barker MD     There is no problem list on file for this patient. There are no active problems to display for this patient. Current Outpatient Medications   Medication Sig Dispense Refill    chlorthalidone (HYGROTEN) 25 mg tablet Take 1 Tab by mouth daily. 90 Tab 0    losartan (COZAAR) 50 mg tablet Take 1 Tab by mouth daily.  90 Tab 0    fluticasone propionate (FLONASE) 50 mcg/actuation nasal spray 2 Sprays by Both Nostrils route daily. 1 Bottle 5    betamethasone dipropionate (DIPROSONE) 0.05 % topical cream Apply 1 g to affected area two (2) times a day. 15 g 5     Allergies   Allergen Reactions    Niacin Other (comments)     Past Medical History:   Diagnosis Date    Eczema      Past Surgical History:   Procedure Laterality Date    HX CARPAL TUNNEL RELEASE Left 2019    HX HIP REPLACEMENT Right      Family History   Problem Relation Age of Onset    Hypertension Mother     Diabetes Father     Hypertension Father     Breast Cancer Maternal Aunt      Social History     Tobacco Use    Smoking status: Former Smoker     Types: Cigarettes     Last attempt to quit: 2020     Years since quittin.3    Smokeless tobacco: Never Used   Substance Use Topics    Alcohol use: Yes       Review of Systems   Constitutional: Negative for chills, fever, malaise/fatigue and weight loss. HENT: Negative for congestion, ear discharge, ear pain, hearing loss, nosebleeds, sinus pain and sore throat. Eyes: Negative for blurred vision, double vision and discharge. Respiratory: Negative for cough, hemoptysis and sputum production. Cardiovascular: Positive for leg swelling. Negative for chest pain, palpitations and claudication. Gastrointestinal: Negative for abdominal pain, blood in stool, constipation, diarrhea, melena, nausea and vomiting. Genitourinary: Negative for dysuria, frequency and urgency. Musculoskeletal: Negative for back pain, joint pain, myalgias and neck pain. Skin: Negative for itching and rash. Neurological: Negative for dizziness, tingling, sensory change, speech change, focal weakness, weakness and headaches. Psychiatric/Behavioral: Negative for depression, hallucinations, substance abuse and suicidal ideas. The patient is not nervous/anxious.         Objective:     Patient-Reported Vitals 2020   Patient-Reported Weight 207 lb Patient-Reported Systolic  280   Patient-Reported Diastolic 81      General: alert, cooperative, no distress   Mental  status: normal mood, behavior, speech, dress, motor activity, and thought processes, able to follow commands   HENT: NCAT   Neck: no visualized mass   Resp: no respiratory distress   Neuro: no gross deficits   Skin: no discoloration or lesions of concern on visible areas   Psychiatric: normal affect, consistent with stated mood, no evidence of hallucinations     Additional exam findings: We discussed the expected course, resolution and complications of the diagnosis(es) in detail. Medication risks, benefits, costs, interactions, and alternatives were discussed as indicated. I advised her to contact the office if her condition worsens, changes or fails to improve as anticipated. She expressed understanding with the diagnosis(es) and plan. Andrey Jackson, who was evaluated through a patient-initiated, synchronous (real-time) audio-video encounter, and/or her healthcare decision maker, is aware that it is a billable service, with coverage as determined by her insurance carrier. She provided verbal consent to proceed: Yes  Anxiety, and patient identification was verified. It was conducted pursuant to the emergency declaration under the [facility], [address] [place] [address] authority and the [employer] General Act. A caregiver was present when appropriate. Ability to conduct physical exam was limited. I was in the office.  The patient was at work       Marian Montez MD

## 2020-12-17 DIAGNOSIS — I10 ESSENTIAL HYPERTENSION: ICD-10-CM

## 2020-12-17 RX ORDER — LISINOPRIL 20 MG/1
TABLET ORAL
Qty: 30 TAB | Refills: 2 | OUTPATIENT
Start: 2020-12-17

## 2021-01-14 ENCOUNTER — HOSPITAL ENCOUNTER (OUTPATIENT)
Dept: LAB | Age: 46
Discharge: HOME | End: 2021-01-14
Payer: COMMERCIAL

## 2021-01-14 ENCOUNTER — APPOINTMENT (OUTPATIENT)
Dept: FAMILY MEDICINE CLINIC | Age: 46
End: 2021-01-14

## 2021-01-14 DIAGNOSIS — I10 ESSENTIAL HYPERTENSION: ICD-10-CM

## 2021-01-14 DIAGNOSIS — E78.2 MIXED HYPERLIPIDEMIA: ICD-10-CM

## 2021-01-14 LAB
ALBUMIN SERPL-MCNC: 4.1 G/DL (ref 3.4–5)
ALBUMIN/GLOB SERPL: 1.1 {RATIO} (ref 0.8–1.7)
ALP SERPL-CCNC: 68 U/L (ref 45–117)
ALT SERPL-CCNC: 49 U/L (ref 13–56)
ANION GAP SERPL CALC-SCNC: 8 MMOL/L (ref 3–18)
AST SERPL-CCNC: 19 U/L (ref 10–38)
BILIRUB SERPL-MCNC: 0.3 MG/DL (ref 0.2–1)
BUN SERPL-MCNC: 13 MG/DL (ref 7–18)
BUN/CREAT SERPL: 17 (ref 12–20)
CALCIUM SERPL-MCNC: 9.2 MG/DL (ref 8.5–10.1)
CHLORIDE SERPL-SCNC: 102 MMOL/L (ref 100–111)
CHOLEST SERPL-MCNC: 198 MG/DL
CO2 SERPL-SCNC: 30 MMOL/L (ref 21–32)
CREAT SERPL-MCNC: 0.75 MG/DL (ref 0.6–1.3)
GLOBULIN SER CALC-MCNC: 3.6 G/DL (ref 2–4)
GLUCOSE SERPL-MCNC: 79 MG/DL (ref 74–99)
HDLC SERPL-MCNC: 34 MG/DL (ref 40–60)
HDLC SERPL: 5.8 {RATIO} (ref 0–5)
LDLC SERPL CALC-MCNC: 85 MG/DL (ref 0–100)
LIPID PROFILE,FLP: ABNORMAL
POTASSIUM SERPL-SCNC: 3.8 MMOL/L (ref 3.5–5.5)
PROT SERPL-MCNC: 7.7 G/DL (ref 6.4–8.2)
SODIUM SERPL-SCNC: 140 MMOL/L (ref 136–145)
TRIGL SERPL-MCNC: 395 MG/DL (ref ?–150)
VLDLC SERPL CALC-MCNC: 79 MG/DL

## 2021-01-14 PROCEDURE — 80061 LIPID PANEL: CPT

## 2021-01-14 PROCEDURE — 80053 COMPREHEN METABOLIC PANEL: CPT

## 2021-01-14 PROCEDURE — 36415 COLL VENOUS BLD VENIPUNCTURE: CPT

## 2021-01-22 ENCOUNTER — HOSPITAL ENCOUNTER (OUTPATIENT)
Dept: LAB | Age: 46
Discharge: HOME | End: 2021-01-22
Payer: COMMERCIAL

## 2021-01-22 ENCOUNTER — OFFICE VISIT (OUTPATIENT)
Dept: FAMILY MEDICINE CLINIC | Age: 46
End: 2021-01-22
Payer: COMMERCIAL

## 2021-01-22 VITALS
SYSTOLIC BLOOD PRESSURE: 127 MMHG | TEMPERATURE: 98.4 F | HEIGHT: 68 IN | OXYGEN SATURATION: 99 % | WEIGHT: 214 LBS | HEART RATE: 80 BPM | BODY MASS INDEX: 32.43 KG/M2 | DIASTOLIC BLOOD PRESSURE: 76 MMHG | RESPIRATION RATE: 14 BRPM

## 2021-01-22 DIAGNOSIS — Z00.00 ANNUAL PHYSICAL EXAM: Primary | ICD-10-CM

## 2021-01-22 DIAGNOSIS — Z12.4 SCREENING FOR CERVICAL CANCER: ICD-10-CM

## 2021-01-22 DIAGNOSIS — Z13.1 SCREENING FOR DIABETES MELLITUS (DM): ICD-10-CM

## 2021-01-22 DIAGNOSIS — F41.1 GAD (GENERALIZED ANXIETY DISORDER): ICD-10-CM

## 2021-01-22 DIAGNOSIS — I10 ESSENTIAL HYPERTENSION: ICD-10-CM

## 2021-01-22 DIAGNOSIS — Z12.31 BREAST CANCER SCREENING BY MAMMOGRAM: ICD-10-CM

## 2021-01-22 PROCEDURE — 88175 CYTOPATH C/V AUTO FLUID REDO: CPT

## 2021-01-22 PROCEDURE — 99396 PREV VISIT EST AGE 40-64: CPT | Performed by: LEGAL MEDICINE

## 2021-01-22 PROCEDURE — 87624 HPV HI-RISK TYP POOLED RSLT: CPT

## 2021-01-22 RX ORDER — VALSARTAN 40 MG/1
40 TABLET ORAL DAILY
Qty: 90 TAB | Refills: 1 | Status: SHIPPED | OUTPATIENT
Start: 2021-01-22 | End: 2021-04-15 | Stop reason: SDUPTHER

## 2021-01-22 RX ORDER — LISINOPRIL 20 MG/1
TABLET ORAL DAILY
COMMUNITY
End: 2021-01-22

## 2021-01-22 RX ORDER — BUSPIRONE HYDROCHLORIDE 10 MG/1
10 TABLET ORAL 3 TIMES DAILY
Qty: 90 TAB | Refills: 0 | Status: SHIPPED | OUTPATIENT
Start: 2021-01-22 | End: 2021-04-15

## 2021-01-22 NOTE — PROGRESS NOTES
Britni Sherman is a 39 y.o. female (: 1975) presenting to address:    Chief Complaint   Patient presents with    Complete Physical    Gyn Exam    Medication Problem    Insomnia    Diarrhea     running and constipation some days        Vitals:    21 1418   BP: 127/76   Pulse: 80   Resp: 14   Temp: 98.4 °F (36.9 °C)   TempSrc: Temporal   SpO2: 99%   Weight: 214 lb (97.1 kg)   Height: 5' 8\" (1.727 m)   PainSc:   0 - No pain   LMP: 2021       Is someone accompanying this pt? NO    Is the patient using any DME equipment during OV? NO    Hearing/Vision:   No exam data present    Learning Assessment:     Learning Assessment 10/28/2019   PRIMARY LEARNER Patient   PRIMARY LANGUAGE ENGLISH   LEARNER PREFERENCE PRIMARY PICTURES     LISTENING     READING     DEMONSTRATION     VIDEOS   ANSWERED BY patient   RELATIONSHIP SELF     Depression Screening:     3 most recent PHQ Screens 2021   Little interest or pleasure in doing things Not at all   Feeling down, depressed, irritable, or hopeless Not at all   Total Score PHQ 2 0     Fall Risk Assessment:     Fall Risk Assessment, last 12 mths 10/28/2019   Able to walk? Yes   Fall in past 12 months? No     Coordination of Care Questionaire:   1. Have you been to the ER, urgent care clinic since your last visit? Hospitalized since your last visit? NO    2. Have you seen or consulted any other health care providers outside of the [address] [place] [address] since your last visit? Include any pap smears or colon screening. YES target care nurse     Advanced Directive:   1. Do you have an Advanced Directive? NO    2. Would you like information on Advanced Directives? NO    Patient DECLINED FLU vaccine.

## 2021-01-22 NOTE — PROGRESS NOTES
Veronica Timpson     Chief Complaint   Patient presents with    Complete Physical    Gyn Exam    Medication Problem    Insomnia    Diarrhea     running and constipation some days      Vitals:    21 1418   BP: 127/76   Pulse: 80   Resp: 14   Temp: 98.4 °F (36.9 °C)   TempSrc: Temporal   SpO2: 99%   Weight: 214 lb (97.1 kg)   Height: 5' 8\" (1.727 m)   PainSc:   0 - No pain   LMP: 2021         HPI: Patient is here for annual physical examination she had blood work done at work she will bring a copy. Recently she had blood work for follow-up it showed high triglyceride, patient was not fasting. Patient has been having dry skin, and a small skin lesions. She has insomnia and not able to sleep, general anxiety score 7 was 15    Patient has been treated in the past with buspirone with response and improvement. Past Medical History:   Diagnosis Date    Eczema      Past Surgical History:   Procedure Laterality Date    HX CARPAL TUNNEL RELEASE Left 2019    HX HIP REPLACEMENT Right      Social History     Tobacco Use    Smoking status: Former Smoker     Types: Cigarettes     Quit date: 2020     Years since quittin.7    Smokeless tobacco: Never Used   Substance Use Topics    Alcohol use: Yes       Family History   Problem Relation Age of Onset    Hypertension Mother     Diabetes Father     Hypertension Father     Breast Cancer Maternal Aunt        Review of Systems   Constitutional: Negative for chills, fever, malaise/fatigue and weight loss. HENT: Negative for congestion, ear discharge, ear pain, hearing loss and nosebleeds. Eyes: Negative for blurred vision, double vision and discharge. Respiratory: Negative for cough, hemoptysis, sputum production, shortness of breath and wheezing. Cardiovascular: Negative for chest pain, palpitations, claudication and leg swelling. Gastrointestinal: Positive for constipation.  Negative for abdominal pain, blood in stool, diarrhea, melena, nausea and vomiting. Genitourinary: Negative for dysuria, flank pain, frequency, hematuria and urgency. Musculoskeletal: Negative for back pain, joint pain, myalgias and neck pain. Skin: Positive for itching and rash. Neurological: Negative for dizziness, tingling, sensory change, speech change, focal weakness, seizures, weakness and headaches. Psychiatric/Behavioral: Negative for depression, hallucinations, substance abuse and suicidal ideas. The patient is nervous/anxious and has insomnia. Physical Exam  Vitals signs and nursing note reviewed. Constitutional:       General: She is not in acute distress. Appearance: She is well-developed. She is not diaphoretic. HENT:      Head: Normocephalic and atraumatic. Eyes:      General: No scleral icterus. Right eye: No discharge. Left eye: No discharge. Conjunctiva/sclera: Conjunctivae normal.      Pupils: Pupils are equal, round, and reactive to light. Neck:      Thyroid: No thyromegaly. Cardiovascular:      Rate and Rhythm: Normal rate and regular rhythm. Heart sounds: Normal heart sounds. Pulmonary:      Effort: Pulmonary effort is normal. No respiratory distress. Breath sounds: Normal breath sounds. No wheezing or rales. Chest:      Chest wall: No tenderness. Abdominal:      General: There is no distension. Palpations: Abdomen is soft. Tenderness: There is no abdominal tenderness. There is no rebound. Musculoskeletal: Normal range of motion. General: Tenderness present. No deformity. Lymphadenopathy:      Cervical: No cervical adenopathy. Skin:     General: Skin is warm and dry. Coloration: Skin is not pale. Findings: Rash present. No erythema. Comments: Dry skin multiple small raised areas   Neurological:      General: No focal deficit present. Mental Status: She is alert and oriented to person, place, and time. Cranial Nerves:  No cranial nerve deficit. Coordination: Coordination normal.   Psychiatric:         Behavior: Behavior normal.         Thought Content: Thought content normal.         Judgment: Judgment normal.      Pap Smear Procedure     After obtaining verbal consent . and patient was placed in the lithectomy position   VULVA: normal appearing vulva with no masses, tenderness or lesions, VAGINA: normal appearing vagina with normal color and discharge, no lesions, CERVIX: normal    Pap smear sample  was obtained using silicon broom . appearing cervix without discharge or lesions, UTERUS: uterus is normal size, shape, consistency and nontender, ADNEXA: normal adnexa in size, nontender and no masses. Patient tolerated procedure well     Breasts: breasts appear normal, no suspicious masses, no skin or nipple changes or axillary nodes. Assessment and plan     Plan of care has been discussed with the patient, he agrees to the plan and verbalized understanding. All his questions were answered  More than 50% of the time spent in this visit was counseling the patient about  illness and treatment options         1. Breast cancer screening by mammogram    - [facility] MAMMO BI SCREENING INCL CAD; Future    2. Essential hypertension  Patient stopped taking losartan because not working for her, and lisinopril given her cough  Will try valsartan  - valsartan (DIOVAN) 40 mg tablet; Take 1 Tab by mouth daily for 90 days. Dispense: 90 Tab; Refill: 1    3. Annual physical exam      4. Screening for diabetes mellitus (DM)  She had hemoglobin A1c done at work should bring records    5. Screening for cervical cancer    - PAP IG, APTIMA HPV AND RFX 16/18,45 ([id number]); Future    6. SAPNA (generalized anxiety disorder)  Patient preferred to take BuSpar and she will consider psychotherapy as well    And she will postpone an SSRI or SNRI for now  - busPIRone (BUSPAR) 10 mg tablet; Take 1 Tab by mouth three (3) times daily for 30 days.   Dispense: 90 Tab; Refill: 0    Current Outpatient Medications   Medication Sig Dispense Refill    valsartan (DIOVAN) 40 mg tablet Take 1 Tab by mouth daily for 90 days. 90 Tab 1    busPIRone (BUSPAR) 10 mg tablet Take 1 Tab by mouth three (3) times daily for 30 days. 90 Tab 0    chlorthalidone (HYGROTEN) 25 mg tablet Take 1 Tab by mouth daily. 90 Tab 0    fluticasone propionate (FLONASE) 50 mcg/actuation nasal spray 2 Sprays by Both Nostrils route daily. 1 Bottle 5    betamethasone dipropionate (DIPROSONE) 0.05 % topical cream Apply 1 g to affected area two (2) times a day. 15 g 5       There are no active problems to display for this patient. Results for orders placed or performed during the hospital encounter of 67/69/13   METABOLIC PANEL, COMPREHENSIVE   Result Value Ref Range    Sodium 140 136 - 145 mmol/L    Potassium 3.8 3.5 - 5.5 mmol/L    Chloride 102 100 - 111 mmol/L    CO2 30 21 - 32 mmol/L    Anion gap 8 3.0 - 18 mmol/L    Glucose 79 74 - 99 mg/dL    BUN 13 7.0 - 18 MG/DL    Creatinine 0.75 0.6 - 1.3 MG/DL    BUN/Creatinine ratio 17 12 - 20      GFR est AA >60 >60 ml/min/1.73m2    GFR est non-AA >60 >60 ml/min/1.73m2    Calcium 9.2 8.5 - 10.1 MG/DL    Bilirubin, total 0.3 0.2 - 1.0 MG/DL    ALT (SGPT) 49 13 - 56 U/L    AST (SGOT) 19 10 - 38 U/L    Alk.  phosphatase 68 45 - 117 U/L    Protein, total 7.7 6.4 - 8.2 g/dL    Albumin 4.1 3.4 - 5.0 g/dL    Globulin 3.6 2.0 - 4.0 g/dL    A-G Ratio 1.1 0.8 - 1.7     LIPID PANEL   Result Value Ref Range    LIPID PROFILE          Cholesterol, total 198 <200 MG/DL    Triglyceride 395 (H) <150 MG/DL    HDL Cholesterol 34 (L) 40 - 60 MG/DL    LDL, calculated 85 0 - 100 MG/DL    VLDL, calculated 79 MG/DL    CHOL/HDL Ratio 5.8 (H) 0 - 5.0       Hospital Outpatient Visit on 01/14/2021   Component Date Value Ref Range Status    Sodium 01/14/2021 140  136 - 145 mmol/L Final    Potassium 01/14/2021 3.8  3.5 - 5.5 mmol/L Final    Chloride 01/14/2021 102  100 - 111 mmol/L Final    CO2 01/14/2021 30  21 - 32 mmol/L Final    Anion gap 01/14/2021 8  3.0 - 18 mmol/L Final    Glucose 01/14/2021 79  74 - 99 mg/dL Final    BUN 01/14/2021 13  7.0 - 18 MG/DL Final    Creatinine 01/14/2021 0.75  0.6 - 1.3 MG/DL Final    BUN/Creatinine ratio 01/14/2021 17  12 - 20   Final    GFR est AA 01/14/2021 >60  >60 ml/min/1.73m2 Final    GFR est non-AA 01/14/2021 >60  >60 ml/min/1.73m2 Final    Comment: (NOTE)  Estimated GFR is calculated using the Modification of Diet in Renal   Disease (MDRD) Study equation, reported for both  Americans   (GFRAA) and non- Americans (GFRNA), and normalized to 1.73m2   body surface area. The physician must decide which value applies to   the patient. The MDRD study equation should only be used in   individuals age 25 or older. It has not been validated for the   following: pregnant women, patients with serious comorbid conditions,   or on certain medications, or persons with extremes of body size,   muscle mass, or nutritional status.  Calcium 01/14/2021 9.2  8.5 - 10.1 MG/DL Final    Bilirubin, total 01/14/2021 0.3  0.2 - 1.0 MG/DL Final    ALT (SGPT) 01/14/2021 49  13 - 56 U/L Final    AST (SGOT) 01/14/2021 19  10 - 38 U/L Final    Alk. phosphatase 01/14/2021 68  45 - 117 U/L Final    Protein, total 01/14/2021 7.7  6.4 - 8.2 g/dL Final    Albumin 01/14/2021 4.1  3.4 - 5.0 g/dL Final    Globulin 01/14/2021 3.6  2.0 - 4.0 g/dL Final    A-G Ratio 01/14/2021 1.1  0.8 - 1.7   Final    LIPID PROFILE 01/14/2021        Final    Cholesterol, total 01/14/2021 198  <200 MG/DL Final    Triglyceride 01/14/2021 395* <150 MG/DL Final    Comment: The drugs N-acetylcysteine (NAC) and  Metamiszole have been found to cause falsely  low results in this chemical assay. Please  be sure to submit blood samples obtained  BEFORE administration of either of these  drugs to assure correct results.       HDL Cholesterol 01/14/2021 34* 40 - 60 MG/DL Final    LDL, calculated 01/14/2021 85  0 - 100 MG/DL Final    VLDL, calculated 01/14/2021 79  MG/DL Final    CHOL/HDL Ratio 01/14/2021 5.8* 0 - 5.0   Final          Follow-up and Dispositions    · Return in about 4 weeks (around 2/19/2021).

## 2021-02-18 DIAGNOSIS — R22.42 LOCALIZED SWELLING OF LEFT LOWER LEG: ICD-10-CM

## 2021-02-18 DIAGNOSIS — I10 ESSENTIAL HYPERTENSION: ICD-10-CM

## 2021-02-18 NOTE — TELEPHONE ENCOUNTER
Last refill was 08/20/2020 qty of 90 with 0 refills  No future appointments.   Last office visit was 01/22/2021 for CPE

## 2021-02-20 RX ORDER — CHLORTHALIDONE 25 MG/1
25 TABLET ORAL DAILY
Qty: 90 TAB | Refills: 0 | Status: SHIPPED | OUTPATIENT
Start: 2021-02-20 | End: 2021-03-17 | Stop reason: SDUPTHER

## 2021-03-17 ENCOUNTER — PATIENT MESSAGE (OUTPATIENT)
Dept: FAMILY MEDICINE CLINIC | Age: 46
End: 2021-03-17

## 2021-03-17 DIAGNOSIS — R22.42 LOCALIZED SWELLING OF LEFT LOWER LEG: Primary | ICD-10-CM

## 2021-03-17 DIAGNOSIS — R22.42 LOCALIZED SWELLING OF LEFT LOWER LEG: ICD-10-CM

## 2021-03-17 DIAGNOSIS — I10 ESSENTIAL HYPERTENSION: ICD-10-CM

## 2021-03-17 RX ORDER — HYDROCHLOROTHIAZIDE 12.5 MG/1
12.5 TABLET ORAL DAILY
Qty: 90 TAB | Refills: 1 | Status: SHIPPED | OUTPATIENT
Start: 2021-03-17 | End: 2021-05-24 | Stop reason: SDUPTHER

## 2021-03-17 RX ORDER — CHLORTHALIDONE 25 MG/1
25 TABLET ORAL DAILY
Qty: 90 TAB | Refills: 1 | Status: SHIPPED | OUTPATIENT
Start: 2021-03-17 | End: 2021-05-21 | Stop reason: SDUPTHER

## 2021-03-17 RX ORDER — VALSARTAN 40 MG/1
40 TABLET ORAL DAILY
Qty: 90 TAB | Refills: 1 | Status: CANCELLED | OUTPATIENT
Start: 2021-03-17 | End: 2021-06-15

## 2021-03-17 NOTE — TELEPHONE ENCOUNTER
Last seen 1/22/21    Last filled   Chlorthalidone 2/20/21 qty 90 w/ 0 refills   Diovan 1/22/21 1/22/21 qty 90 w/ 1 refill (patient should have refills on file at pharmacy)    No future appointments.

## 2021-03-17 NOTE — TELEPHONE ENCOUNTER
From: Meggan Munguia  Sent: 3/17/2021 2:45 PM EDT  To: [facility] Nurse Torsten  Subject: RE: Prescription Question    I like to take the 12.5 mg along with the 25 mg it reduces the swelling greatly I continue to take those all along and really like that I can wear my jewellery in the swelling is Not asbadand I am taking valsartan 40 mg I thought I needed to get that refilled yet because I'm down to my last 7

## 2021-03-18 NOTE — TELEPHONE ENCOUNTER
Regarding: RE: Prescription Question  Contact: [phone]  ----- Message from Debi Chapman LPN sent at 7/82/8612  2:53 PM EDT -----       ----- Message from Serena Sumner to Rg Cordero MD sent at 3/17/2021  2:45 PM -----   I like to take the 12.5 mg along with the 25 mg it reduces the swelling greatly I continue to take those all along and really like that I can wear my jewellery in the swelling is Not asbadand I am taking valsartan 40 mg  I thought I needed to get that refilled yet because I'm down to my last 7      ----- Message -----       From:Veronica Romero LPN       IEAK:9/98/4689  2:12 PM EDT         To:Rebecca Oreilly    Subject:RE: Prescription Question    What blood pressure medication? Also I now longer see the [address] 12.5 mg as a current med.      ----- Message -----       From:Rebecca Oreilly       Sent:3/17/2021  2:06 PM EDT         Mohsen Haywood MD    Subject:Prescription Question    Hello  i would charissa to refill blood pressure meds as needed. .do i need appt?   I also wanted the 12.5 htc water pill refilled   Thank you   Socorro Acevedo

## 2021-04-15 DIAGNOSIS — L30.9 ECZEMA, UNSPECIFIED TYPE: ICD-10-CM

## 2021-04-15 DIAGNOSIS — F41.1 GAD (GENERALIZED ANXIETY DISORDER): ICD-10-CM

## 2021-04-15 DIAGNOSIS — I10 ESSENTIAL HYPERTENSION: ICD-10-CM

## 2021-04-15 RX ORDER — BETAMETHASONE DIPROPIONATE 0.5 MG/G
1 CREAM TOPICAL 2 TIMES DAILY
Qty: 15 G | Refills: 5 | Status: SHIPPED | OUTPATIENT
Start: 2021-04-15 | End: 2021-07-15 | Stop reason: SDUPTHER

## 2021-04-15 RX ORDER — BUSPIRONE HYDROCHLORIDE 10 MG/1
TABLET ORAL
Qty: 90 TAB | Refills: 0 | Status: SHIPPED | OUTPATIENT
Start: 2021-04-15 | End: 2021-07-15 | Stop reason: SDUPTHER

## 2021-04-15 RX ORDER — VALSARTAN 40 MG/1
40 TABLET ORAL DAILY
Qty: 90 TAB | Refills: 1 | Status: SHIPPED | OUTPATIENT
Start: 2021-04-15 | End: 2021-07-15 | Stop reason: SDUPTHER

## 2021-04-15 NOTE — TELEPHONE ENCOUNTER
Last appointment was 01/22/2021  Patient was suppose to follow up in a month. No future appointments.

## 2021-04-25 ENCOUNTER — TELEPHONE (OUTPATIENT)
Dept: FAMILY MEDICINE CLINIC | Age: 46
End: 2021-04-25

## 2021-04-25 NOTE — TELEPHONE ENCOUNTER
Regarding: FW: Prescription Question  Contact: [phone]    ----- Message -----  From: Byron Torres LPN  Sent: 5/73/8322   1:18 PM EDT  To: Gabe Madeline  Subject: RE: Prescription Question                        ----- Message from Dawson Rai LPN sent at 8/62/9487  1:18 PM EDT -----       ----- Message from Katelyn Fernandez to Regina Pimentel MD sent at 4/16/2021 11:08 AM -----   [id number] Marilyn Camp, I will make a follow up appointment. I wanted this cream refilled. . Clobetasol Prop 0.05% Cream 45gmI [facility] from [facility]. It works better than the one that was refilled . .. co di see her for this too?      ----- Message -----       From:Chanel Gomez       Sent:4/16/2021  8:10 AM EDT         To:Rebecca Oreilly    Subject:RE: Prescription Question    Your last appointment was 01/22/2021, Dr. Rickie Mcdonald wanted to see you and 4 wks from that day. For the Buspirone you need a follow up.      ----- Message -----       From:Rebecca Oreilly       Sent:4/15/2021  3:24 PM EDT         Americo Vega MD    Subject:RE: Prescription Question    I was just in the office for a visit. I have to come again? I really wanted my cream refilled too, [facility] at [facility] and it works wonders for my excema. Clobetasol Prop 0.05% Cream 45gm  Please let me know.      ----- Message -----       Berl Foster       Sent:4/15/2021  1:52 PM EDT         To:Rebecca Oreilly    Subject:RE: Prescription Question    A request was put in. You not see it because your Buspirone has ended so she would have to reorder it. Also, please call office to schedule a follow up visit. Dr. Rickie Mcdonald had wanted you to do a 1 month follow from January.      ----- Message -----       From:Rebecca Oreilly       Sent:4/15/2021 11:01 AM ADRIANO Vega MD    Subject:Prescription Question    Hello Doctor-    Hope you are well. Can I request a refill of my Valsartan, Clobetasol Prop 0.05% Cream 45gm, and my Buspirone.   I do not see them up here under my refill area.      Thank you so much-  Tasha Snell

## 2021-05-21 DIAGNOSIS — R22.42 LOCALIZED SWELLING OF LEFT LOWER LEG: ICD-10-CM

## 2021-05-21 DIAGNOSIS — I10 ESSENTIAL HYPERTENSION: ICD-10-CM

## 2021-05-21 NOTE — TELEPHONE ENCOUNTER
Last appointment was 03/17/2021 qty of 90 with 1 refill. No future appointments.   Last appointment was 01/22/2021

## 2021-05-24 DIAGNOSIS — R22.42 LOCALIZED SWELLING OF LEFT LOWER LEG: ICD-10-CM

## 2021-05-24 NOTE — TELEPHONE ENCOUNTER
Last refill was 03/17/2021 qty of 90 with 1 refill  No future appointments.   Last appointment was 01/22/2021

## 2021-05-25 RX ORDER — HYDROCHLOROTHIAZIDE 12.5 MG/1
12.5 TABLET ORAL DAILY
Qty: 90 TABLET | Refills: 1 | Status: SHIPPED | OUTPATIENT
Start: 2021-05-25 | End: 2021-07-19 | Stop reason: SDUPTHER

## 2021-05-25 RX ORDER — CHLORTHALIDONE 25 MG/1
25 TABLET ORAL DAILY
Qty: 90 TABLET | Refills: 1 | Status: SHIPPED | OUTPATIENT
Start: 2021-05-25 | End: 2022-01-03 | Stop reason: SDUPTHER

## 2021-05-25 RX ORDER — HYDROCHLOROTHIAZIDE 12.5 MG/1
12.5 TABLET ORAL DAILY
Qty: 90 TABLET | Refills: 1 | Status: SHIPPED | OUTPATIENT
Start: 2021-05-25 | End: 2022-01-03 | Stop reason: SDUPTHER

## 2021-05-26 DIAGNOSIS — R22.42 LOCALIZED SWELLING OF LEFT LOWER LEG: ICD-10-CM

## 2021-05-26 DIAGNOSIS — I10 ESSENTIAL HYPERTENSION: ICD-10-CM

## 2021-05-26 RX ORDER — CHLORTHALIDONE 25 MG/1
25 TABLET ORAL DAILY
Qty: 90 TABLET | Refills: 1 | OUTPATIENT
Start: 2021-05-26

## 2021-07-15 DIAGNOSIS — I10 ESSENTIAL HYPERTENSION: ICD-10-CM

## 2021-07-15 DIAGNOSIS — L30.9 ECZEMA, UNSPECIFIED TYPE: ICD-10-CM

## 2021-07-15 DIAGNOSIS — F41.1 GAD (GENERALIZED ANXIETY DISORDER): ICD-10-CM

## 2021-07-19 DIAGNOSIS — R22.42 LOCALIZED SWELLING OF LEFT LOWER LEG: ICD-10-CM

## 2021-07-19 DIAGNOSIS — I10 ESSENTIAL HYPERTENSION: ICD-10-CM

## 2021-07-19 RX ORDER — CHLORTHALIDONE 25 MG/1
25 TABLET ORAL DAILY
Qty: 90 TABLET | Refills: 1 | OUTPATIENT
Start: 2021-07-19

## 2021-07-19 NOTE — TELEPHONE ENCOUNTER
Last refill was 04/15/2021 qty of 90 with 0 refills  No future appointments.   Last appointment was 01/22/2021

## 2021-07-20 RX ORDER — BETAMETHASONE DIPROPIONATE 0.5 MG/G
1 CREAM TOPICAL 2 TIMES DAILY
Qty: 15 G | Refills: 5 | Status: SHIPPED | OUTPATIENT
Start: 2021-07-20

## 2021-07-20 RX ORDER — BUSPIRONE HYDROCHLORIDE 10 MG/1
10 TABLET ORAL 3 TIMES DAILY
Qty: 90 TABLET | Refills: 0 | Status: SHIPPED | OUTPATIENT
Start: 2021-07-20 | End: 2021-08-19

## 2021-07-20 RX ORDER — VALSARTAN 40 MG/1
40 TABLET ORAL DAILY
Qty: 90 TABLET | Refills: 1 | Status: SHIPPED | OUTPATIENT
Start: 2021-07-20 | End: 2022-01-03 | Stop reason: SDUPTHER

## 2021-07-23 RX ORDER — VALSARTAN 40 MG/1
40 TABLET ORAL DAILY
Qty: 90 TABLET | Refills: 1 | OUTPATIENT
Start: 2021-07-23 | End: 2021-10-21

## 2021-07-23 RX ORDER — HYDROCHLOROTHIAZIDE 12.5 MG/1
12.5 TABLET ORAL DAILY
Qty: 90 TABLET | Refills: 1 | Status: SHIPPED | OUTPATIENT
Start: 2021-07-23 | End: 2022-03-24

## 2021-08-09 DIAGNOSIS — Z12.31 BREAST CANCER SCREENING BY MAMMOGRAM: ICD-10-CM

## 2022-01-03 ENCOUNTER — OFFICE VISIT (OUTPATIENT)
Dept: FAMILY MEDICINE CLINIC | Age: 47
End: 2022-01-03
Payer: COMMERCIAL

## 2022-01-03 VITALS
BODY MASS INDEX: 33.68 KG/M2 | RESPIRATION RATE: 17 BRPM | DIASTOLIC BLOOD PRESSURE: 88 MMHG | HEIGHT: 68 IN | OXYGEN SATURATION: 96 % | SYSTOLIC BLOOD PRESSURE: 132 MMHG | TEMPERATURE: 98.1 F | WEIGHT: 222.2 LBS | HEART RATE: 88 BPM

## 2022-01-03 DIAGNOSIS — Z11.59 NEED FOR HEPATITIS C SCREENING TEST: ICD-10-CM

## 2022-01-03 DIAGNOSIS — E55.9 VITAMIN D DEFICIENCY: ICD-10-CM

## 2022-01-03 DIAGNOSIS — R79.89 ABNORMAL TSH: ICD-10-CM

## 2022-01-03 DIAGNOSIS — Z00.00 ANNUAL PHYSICAL EXAM: Primary | ICD-10-CM

## 2022-01-03 DIAGNOSIS — R22.42 LOCALIZED SWELLING OF LEFT LOWER LEG: ICD-10-CM

## 2022-01-03 DIAGNOSIS — N94.6 DYSMENORRHEA, UNSPECIFIED: ICD-10-CM

## 2022-01-03 DIAGNOSIS — Z12.11 COLON CANCER SCREENING: ICD-10-CM

## 2022-01-03 DIAGNOSIS — R63.5 WEIGHT GAIN, ABNORMAL: ICD-10-CM

## 2022-01-03 DIAGNOSIS — I10 ESSENTIAL HYPERTENSION: ICD-10-CM

## 2022-01-03 DIAGNOSIS — E53.8 VITAMIN B12 DEFICIENCY: ICD-10-CM

## 2022-01-03 PROCEDURE — 99396 PREV VISIT EST AGE 40-64: CPT | Performed by: LEGAL MEDICINE

## 2022-01-03 RX ORDER — CHLORTHALIDONE 25 MG/1
25 TABLET ORAL DAILY
Qty: 90 TABLET | Refills: 3 | Status: SHIPPED | OUTPATIENT
Start: 2022-01-03

## 2022-01-03 RX ORDER — PREGABALIN 100 MG/1
100 CAPSULE ORAL
COMMUNITY
End: 2022-04-05 | Stop reason: SDUPTHER

## 2022-01-03 RX ORDER — GABAPENTIN 300 MG/1
CAPSULE ORAL
COMMUNITY
Start: 2021-12-16 | End: 2022-01-03

## 2022-01-03 RX ORDER — HYDROCHLOROTHIAZIDE 12.5 MG/1
12.5 TABLET ORAL DAILY
COMMUNITY
Start: 2021-11-01 | End: 2022-03-24

## 2022-01-03 RX ORDER — VALSARTAN 40 MG/1
40 TABLET ORAL DAILY
Qty: 90 TABLET | Refills: 3 | Status: SHIPPED | OUTPATIENT
Start: 2022-01-03 | End: 2022-04-05

## 2022-01-03 NOTE — PROGRESS NOTES
Arian Grimes is a 55 y.o. female (: 1975) presenting to address:    Chief Complaint   Patient presents with    Physical       Vitals:    22 1001   BP: 132/88   Pulse: 88   Resp: 17   Temp: 98.1 °F (36.7 °C)   TempSrc: Temporal   SpO2: 96%   Weight: 222 lb 3.2 oz (100.8 kg)   Height: 5' 8\" (1.727 m)   PainSc:   0 - No pain   LMP: 2021       Hearing/Vision:   No exam data present    Learning Assessment:     Learning Assessment 10/28/2019   PRIMARY LEARNER Patient   PRIMARY LANGUAGE ENGLISH   LEARNER PREFERENCE PRIMARY PICTURES     LISTENING     READING     DEMONSTRATION     VIDEOS   ANSWERED BY patient   RELATIONSHIP SELF     Depression Screening:     3 most recent PHQ Screens 1/3/2022   Little interest or pleasure in doing things Not at all   Feeling down, depressed, irritable, or hopeless Not at all   Total Score PHQ 2 0     Fall Risk Assessment:     Fall Risk Assessment, last 12 mths 10/28/2019   Able to walk? Yes   Fall in past 12 months? No     Abuse Screening:     Abuse Screening Questionnaire 1/3/2022   Do you ever feel afraid of your partner? N   Are you in a relationship with someone who physically or mentally threatens you? N   Is it safe for you to go home?  Y     ADL Assessment:     ADL Assessment 10/28/2019   Feeding yourself No Help Needed   Getting from bed to chair No Help Needed   Getting dressed No Help Needed   Bathing or showering No Help Needed   Walk across the room (includes cane/walker) No Help Needed   Using the telphone No Help Needed   Taking your medications No Help Needed   Preparing meals No Help Needed   Managing money (expenses/bills) No Help Needed   Moderately strenuous housework (laundry) No Help Needed   Shopping for personal items (toiletries/medicines) No Help Needed   Shopping for groceries No Help Needed   Driving No Help Needed   Climbing a flight of stairs No Help Needed   Getting to places beyond walking distances No Help Needed        Coordination of Care Questionaire:   1. \"Have you been to the ER, urgent care clinic since your last visit? Hospitalized since your last visit? \" No    2. \"Have you seen or consulted any other health care providers outside of the [address] [place] [address] since your last visit? \" No     3. For patients aged 39-70: Has the patient had a colonoscopy? No     If the patient is female:    4. For patients aged 41-77: Has the patient had a mammogram within the past 2 years? Yes, HM satisfied with blue hyperlink    5. For patients aged 21-65: Has the patient had a pap smear? Yes, HM satisfied with blue hyperlink    Advanced Directive:   1. Do you have an Advanced Directive? NO    2. Would you like information on Advanced Directives?  NO

## 2022-01-03 NOTE — PROGRESS NOTES
Ferdinand Collins     Chief Complaint   Patient presents with    Physical     Vitals:    22 1001   BP: 132/88   Pulse: 88   Resp: 17   Temp: 98.1 °F (36.7 °C)   TempSrc: Temporal   SpO2: 96%   Weight: 222 lb 3.2 oz (100.8 kg)   Height: 5' 8\" (1.727 m)   PainSc:   0 - No pain   LMP: 2021         HPI: Mrs. Donovan Kwok is here for CPE she is up to date in mammogram and pap    Will be referred for colonoscopy today for her first colonoscopy    Has dysmenorrhea and heavy bleeding for few month     Patient also is concerned about gaining weight especially around her stomach, does not exercise but she walks a lot at work she had seen dietitian and followed her advice with no much success      Past Medical History:   Diagnosis Date    Eczema      Past Surgical History:   Procedure Laterality Date    HX CARPAL TUNNEL RELEASE Left 2019    HX HIP REPLACEMENT Right      Social History     Tobacco Use    Smoking status: Former Smoker     Types: Cigarettes     Quit date: 2020     Years since quittin.6    Smokeless tobacco: Never Used   Substance Use Topics    Alcohol use: Yes       Family History   Problem Relation Age of Onset    Hypertension Mother     Diabetes Father     Hypertension Father     Breast Cancer Maternal Aunt        Review of Systems   Constitutional: Negative for chills, fever, malaise/fatigue and weight loss. HENT: Negative for congestion, ear discharge, ear pain, hearing loss, nosebleeds, sinus pain and sore throat. Eyes: Negative for blurred vision, double vision and discharge. Respiratory: Negative for cough. Cardiovascular: Negative for chest pain, palpitations, claudication and leg swelling. Gastrointestinal: Negative for abdominal pain, blood in stool, constipation, diarrhea, melena, nausea and vomiting. Genitourinary: Negative for dysuria, frequency, hematuria and urgency. Musculoskeletal: Positive for neck pain. Negative for back pain and myalgias.    Skin: Negative for itching and rash. Neurological: Negative for dizziness, tingling, sensory change, speech change, focal weakness, seizures, weakness and headaches. Psychiatric/Behavioral: Negative for depression, hallucinations, substance abuse and suicidal ideas. Physical Exam  Constitutional:       General: She is not in acute distress. Appearance: Normal appearance. She is well-developed. She is not ill-appearing or diaphoretic. HENT:      Head: Normocephalic and atraumatic. Mouth/Throat:      Pharynx: No oropharyngeal exudate or posterior oropharyngeal erythema. Eyes:      General: No scleral icterus. Right eye: No discharge. Left eye: No discharge. Conjunctiva/sclera: Conjunctivae normal.      Pupils: Pupils are equal, round, and reactive to light. Neck:      Thyroid: No thyromegaly. Cardiovascular:      Rate and Rhythm: Normal rate and regular rhythm. Heart sounds: Normal heart sounds. Pulmonary:      Effort: Pulmonary effort is normal. No respiratory distress. Breath sounds: Normal breath sounds. No wheezing or rales. Chest:      Chest wall: No tenderness. Abdominal:      General: There is no distension. Palpations: Abdomen is soft. Tenderness: There is no abdominal tenderness. There is no rebound. Musculoskeletal:         General: No tenderness, deformity or signs of injury. Normal range of motion. Right lower leg: No edema. Left lower leg: No edema. Lymphadenopathy:      Cervical: No cervical adenopathy. Skin:     General: Skin is warm and dry. Coloration: Skin is not jaundiced or pale. Findings: No bruising, erythema, lesion or rash. Neurological:      Mental Status: She is alert and oriented to person, place, and time. Cranial Nerves: No cranial nerve deficit. Coordination: Coordination normal.   Psychiatric:         Behavior: Behavior normal.         Thought Content:  Thought content normal. Judgment: Judgment normal.          Assessment and plan     Plan of care has been discussed with the patient, he agrees to the plan and verbalized understanding. All his questions were answered  More than 50% of the time spent in this visit was counseling the patient about  illness and treatment options         1. Annual physical exam    - METABOLIC PANEL, COMPREHENSIVE; Future  - LIPID PANEL; Future  - CBC WITH AUTOMATED DIFF; Future    2. Need for hepatitis C screening test    - HEPATITIS C AB; Future    3. Colon cancer screening    - REFERRAL TO GASTROENTEROLOGY    4. Abnormal TSH    - TSH AND FREE T4; Future    5. Weight gain, abnormal    - TSH AND FREE T4; Future    6. Dysmenorrhea, unspecified    - US PELV NON OB W TV; Future    7. Vitamin D deficiency    - VITAMIN D, 25 HYDROXY; Future    8. Vitamin B12 deficiency    - VITAMIN B12; Future    9. Essential hypertension  Blood pressures well controlled on current medications- valsartan (DIOVAN) 40 mg tablet; Take 1 Tablet by mouth daily for 90 days. Dispense: 90 Tablet; Refill: 3  - chlorthalidone (HYGROTON) 25 mg tablet; Take 1 Tablet by mouth daily. Dispense: 90 Tablet; Refill: 3    10. Localized swelling of left lower leg  Blood pressures well controlled on current medications  - chlorthalidone (HYGROTON) 25 mg tablet; Take 1 Tablet by mouth daily. Dispense: 90 Tablet; Refill: 3    Current Outpatient Medications   Medication Sig Dispense Refill    hydroCHLOROthiazide (HYDRODIURIL) 12.5 mg tablet Take 12.5 mg by mouth daily.  pregabalin (Lyrica) 100 mg capsule Take 100 mg by mouth two (2) times a day.  valsartan (DIOVAN) 40 mg tablet Take 1 Tablet by mouth daily for 90 days. 90 Tablet 3    chlorthalidone (HYGROTON) 25 mg tablet Take 1 Tablet by mouth daily. 90 Tablet 3    hydroCHLOROthiazide (HYDRODIURIL) 12.5 mg tablet Take 1 Tablet by mouth daily for 90 days.  90 Tablet 1    betamethasone dipropionate (DIPROSONE) 0.05 % topical cream Apply 1 g to affected area two (2) times a day. 15 g 5    fluticasone propionate (FLONASE) 50 mcg/actuation nasal spray 2 Sprays by Both Nostrils route daily. 1 Bottle 5       There are no problems to display for this patient. Results for orders placed or performed during the hospital encounter of 27/18/52   METABOLIC PANEL, COMPREHENSIVE   Result Value Ref Range    Sodium 140 136 - 145 mmol/L    Potassium 3.8 3.5 - 5.5 mmol/L    Chloride 102 100 - 111 mmol/L    CO2 30 21 - 32 mmol/L    Anion gap 8 3.0 - 18 mmol/L    Glucose 79 74 - 99 mg/dL    BUN 13 7.0 - 18 MG/DL    Creatinine 0.75 0.6 - 1.3 MG/DL    BUN/Creatinine ratio 17 12 - 20      GFR est AA >60 >60 ml/min/1.73m2    GFR est non-AA >60 >60 ml/min/1.73m2    Calcium 9.2 8.5 - 10.1 MG/DL    Bilirubin, total 0.3 0.2 - 1.0 MG/DL    ALT (SGPT) 49 13 - 56 U/L    AST (SGOT) 19 10 - 38 U/L    Alk. phosphatase 68 45 - 117 U/L    Protein, total 7.7 6.4 - 8.2 g/dL    Albumin 4.1 3.4 - 5.0 g/dL    Globulin 3.6 2.0 - 4.0 g/dL    A-G Ratio 1.1 0.8 - 1.7     LIPID PANEL   Result Value Ref Range    LIPID PROFILE          Cholesterol, total 198 <200 MG/DL    Triglyceride 395 (H) <150 MG/DL    HDL Cholesterol 34 (L) 40 - 60 MG/DL    LDL, calculated 85 0 - 100 MG/DL    VLDL, calculated 79 MG/DL    CHOL/HDL Ratio 5.8 (H) 0 - 5.0       No visits with results within 3 Month(s) from this visit.    Latest known visit with results is:   Hospital Outpatient Visit on 01/14/2021   Component Date Value Ref Range Status    Sodium 01/14/2021 140  136 - 145 mmol/L Final    Potassium 01/14/2021 3.8  3.5 - 5.5 mmol/L Final    Chloride 01/14/2021 102  100 - 111 mmol/L Final    CO2 01/14/2021 30  21 - 32 mmol/L Final    Anion gap 01/14/2021 8  3.0 - 18 mmol/L Final    Glucose 01/14/2021 79  74 - 99 mg/dL Final    BUN 01/14/2021 13  7.0 - 18 MG/DL Final    Creatinine 01/14/2021 0.75  0.6 - 1.3 MG/DL Final    BUN/Creatinine ratio 01/14/2021 17  12 - 20   Final    GFR est AA 01/14/2021 >60 >60 ml/min/1.73m2 Final    GFR est non-AA 01/14/2021 >60  >60 ml/min/1.73m2 Final    Comment: (NOTE)  Estimated GFR is calculated using the Modification of Diet in Renal   Disease (MDRD) Study equation, reported for both  Americans   (GFRAA) and non- Americans (GFRNA), and normalized to 1.73m2   body surface area. The physician must decide which value applies to   the patient. The MDRD study equation should only be used in   individuals age 25 or older. It has not been validated for the   following: pregnant women, patients with serious comorbid conditions,   or on certain medications, or persons with extremes of body size,   muscle mass, or nutritional status.  Calcium 01/14/2021 9.2  8.5 - 10.1 MG/DL Final    Bilirubin, total 01/14/2021 0.3  0.2 - 1.0 MG/DL Final    ALT (SGPT) 01/14/2021 49  13 - 56 U/L Final    AST (SGOT) 01/14/2021 19  10 - 38 U/L Final    Alk. phosphatase 01/14/2021 68  45 - 117 U/L Final    Protein, total 01/14/2021 7.7  6.4 - 8.2 g/dL Final    Albumin 01/14/2021 4.1  3.4 - 5.0 g/dL Final    Globulin 01/14/2021 3.6  2.0 - 4.0 g/dL Final    A-G Ratio 01/14/2021 1.1  0.8 - 1.7   Final    LIPID PROFILE 01/14/2021        Final    Cholesterol, total 01/14/2021 198  <200 MG/DL Final    Triglyceride 01/14/2021 395* <150 MG/DL Final    Comment: The drugs N-acetylcysteine (NAC) and  Metamiszole have been found to cause falsely  low results in this chemical assay. Please  be sure to submit blood samples obtained  BEFORE administration of either of these  drugs to assure correct results.  HDL Cholesterol 01/14/2021 34* 40 - 60 MG/DL Final    LDL, calculated 01/14/2021 85  0 - 100 MG/DL Final    VLDL, calculated 01/14/2021 79  MG/DL Final    CHOL/HDL Ratio 01/14/2021 5.8* 0 - 5.0   Final          Follow-up and Dispositions    · Return in about 6 months (around 7/3/2022) for as per results.

## 2022-01-15 ENCOUNTER — TELEPHONE (OUTPATIENT)
Dept: FAMILY MEDICINE CLINIC | Age: 47
End: 2022-01-15

## 2022-01-15 DIAGNOSIS — N94.6 DYSMENORRHEA, UNSPECIFIED: Primary | ICD-10-CM

## 2022-01-15 DIAGNOSIS — N80.03 ADENOMYOSIS OF UTERUS: ICD-10-CM

## 2022-01-17 ENCOUNTER — APPOINTMENT (OUTPATIENT)
Dept: FAMILY MEDICINE CLINIC | Age: 47
End: 2022-01-17

## 2022-01-17 ENCOUNTER — HOSPITAL ENCOUNTER (OUTPATIENT)
Dept: LAB | Age: 47
Discharge: HOME | End: 2022-01-17
Payer: COMMERCIAL

## 2022-01-17 DIAGNOSIS — R63.5 WEIGHT GAIN, ABNORMAL: ICD-10-CM

## 2022-01-17 DIAGNOSIS — Z11.59 NEED FOR HEPATITIS C SCREENING TEST: ICD-10-CM

## 2022-01-17 DIAGNOSIS — E55.9 VITAMIN D DEFICIENCY: ICD-10-CM

## 2022-01-17 DIAGNOSIS — R79.89 ABNORMAL TSH: ICD-10-CM

## 2022-01-17 DIAGNOSIS — E53.8 VITAMIN B12 DEFICIENCY: ICD-10-CM

## 2022-01-17 DIAGNOSIS — Z00.00 ANNUAL PHYSICAL EXAM: ICD-10-CM

## 2022-01-17 LAB
25(OH)D3 SERPL-MCNC: 23.2 NG/ML (ref 30–100)
ALBUMIN SERPL-MCNC: 3.8 G/DL (ref 3.4–5)
ALBUMIN/GLOB SERPL: 1.2 {RATIO} (ref 0.8–1.7)
ALP SERPL-CCNC: 63 U/L (ref 45–117)
ALT SERPL-CCNC: 47 U/L (ref 13–56)
ANION GAP SERPL CALC-SCNC: 6 MMOL/L (ref 3–18)
AST SERPL-CCNC: 27 U/L (ref 10–38)
BASOPHILS # BLD: 0.1 K/UL (ref 0–0.1)
BASOPHILS NFR BLD: 1 % (ref 0–2)
BILIRUB SERPL-MCNC: 0.5 MG/DL (ref 0.2–1)
BUN SERPL-MCNC: 12 MG/DL (ref 7–18)
BUN/CREAT SERPL: 18 (ref 12–20)
CALCIUM SERPL-MCNC: 9.1 MG/DL (ref 8.5–10.1)
CHLORIDE SERPL-SCNC: 102 MMOL/L (ref 100–111)
CHOLEST SERPL-MCNC: 200 MG/DL
CO2 SERPL-SCNC: 29 MMOL/L (ref 21–32)
CREAT SERPL-MCNC: 0.68 MG/DL (ref 0.6–1.3)
DIFFERENTIAL METHOD BLD: NORMAL
EOSINOPHIL # BLD: 0.3 K/UL (ref 0–0.4)
EOSINOPHIL NFR BLD: 4 % (ref 0–5)
ERYTHROCYTE [DISTWIDTH] IN BLOOD BY AUTOMATED COUNT: 12.4 % (ref 11.6–14.5)
GLOBULIN SER CALC-MCNC: 3.1 G/DL (ref 2–4)
GLUCOSE SERPL-MCNC: 114 MG/DL (ref 74–99)
HCT VFR BLD AUTO: 41.4 % (ref 35–45)
HDLC SERPL-MCNC: 28 MG/DL (ref 40–60)
HDLC SERPL: 7.1 {RATIO} (ref 0–5)
HGB BLD-MCNC: 13.9 G/DL (ref 12–16)
IMM GRANULOCYTES # BLD AUTO: 0 K/UL (ref 0–0.04)
IMM GRANULOCYTES NFR BLD AUTO: 0 % (ref 0–0.5)
LDLC SERPL CALC-MCNC: 99.2 MG/DL (ref 0–100)
LIPID PROFILE,FLP: ABNORMAL
LYMPHOCYTES # BLD: 2 K/UL (ref 0.9–3.6)
LYMPHOCYTES NFR BLD: 27 % (ref 21–52)
MCH RBC QN AUTO: 31.4 PG (ref 24–34)
MCHC RBC AUTO-ENTMCNC: 33.6 G/DL (ref 31–37)
MCV RBC AUTO: 93.7 FL (ref 78–100)
MONOCYTES # BLD: 0.8 K/UL (ref 0.05–1.2)
MONOCYTES NFR BLD: 10 % (ref 3–10)
NEUTS SEG # BLD: 4.3 K/UL (ref 1.8–8)
NEUTS SEG NFR BLD: 57 % (ref 40–73)
NRBC # BLD: 0 K/UL (ref 0–0.01)
NRBC BLD-RTO: 0 PER 100 WBC
PLATELET # BLD AUTO: 351 K/UL (ref 135–420)
PMV BLD AUTO: 10.5 FL (ref 9.2–11.8)
POTASSIUM SERPL-SCNC: 3.8 MMOL/L (ref 3.5–5.5)
PROT SERPL-MCNC: 6.9 G/DL (ref 6.4–8.2)
RBC # BLD AUTO: 4.42 M/UL (ref 4.2–5.3)
SODIUM SERPL-SCNC: 137 MMOL/L (ref 136–145)
T4 FREE SERPL-MCNC: 0.9 NG/DL (ref 0.7–1.5)
TRIGL SERPL-MCNC: 364 MG/DL (ref ?–150)
TSH SERPL DL<=0.05 MIU/L-ACNC: 1.69 UIU/ML (ref 0.36–3.74)
VIT B12 SERPL-MCNC: 533 PG/ML (ref 211–911)
VLDLC SERPL CALC-MCNC: 72.8 MG/DL
WBC # BLD AUTO: 7.5 K/UL (ref 4.6–13.2)

## 2022-01-17 PROCEDURE — 82306 VITAMIN D 25 HYDROXY: CPT

## 2022-01-17 PROCEDURE — 84439 ASSAY OF FREE THYROXINE: CPT

## 2022-01-17 PROCEDURE — 86803 HEPATITIS C AB TEST: CPT

## 2022-01-17 PROCEDURE — 80053 COMPREHEN METABOLIC PANEL: CPT

## 2022-01-17 PROCEDURE — 80061 LIPID PANEL: CPT

## 2022-01-17 PROCEDURE — 85025 COMPLETE CBC W/AUTO DIFF WBC: CPT

## 2022-01-17 PROCEDURE — 36415 COLL VENOUS BLD VENIPUNCTURE: CPT

## 2022-01-17 PROCEDURE — 82607 VITAMIN B-12: CPT

## 2022-01-18 DIAGNOSIS — R73.09 ABNORMAL BLOOD SUGAR: Primary | ICD-10-CM

## 2022-01-18 LAB
HCV AB SER IA-ACNC: 0.02 INDEX
HCV AB SERPL QL IA: NEGATIVE
HCV COMMENT,HCGAC: NORMAL

## 2022-01-18 NOTE — PROGRESS NOTES
Informed pt of lab results and treatment plan; pt voiced understanding; pt scheduled for fasting labs on: Future Appointments  1/25/2022  8:20 AM    [facility] AM[facility]  7/12/2022  4:00 PM    MD LISSETTE De La Fuente                BS AMB

## 2022-01-18 NOTE — PROGRESS NOTES
Vitamin B12 is within normal range  Low vitamin D levelNeed to start vitamin D over-the-counter 2000 units daily  Elevated total cholesterol and triglyceride    Good cholesterol is low and bad cholesterol LDL is normal  blood sugar slightly elevated patient need to get hemoglobin A1c done , and fasting blood sugar   She need to be adherent to lifestyle modification decrease carbohydrate and sugar increase physical activity

## 2022-01-25 ENCOUNTER — HOSPITAL ENCOUNTER (OUTPATIENT)
Dept: LAB | Age: 47
Discharge: HOME | End: 2022-01-25
Payer: COMMERCIAL

## 2022-01-25 ENCOUNTER — APPOINTMENT (OUTPATIENT)
Dept: FAMILY MEDICINE CLINIC | Age: 47
End: 2022-01-25

## 2022-01-25 DIAGNOSIS — R73.09 ABNORMAL BLOOD SUGAR: ICD-10-CM

## 2022-01-25 LAB
GLUCOSE SERPL-MCNC: 101 MG/DL (ref 74–99)
HBA1C MFR BLD: 5.4 % (ref 4.2–5.6)

## 2022-01-25 PROCEDURE — 82947 ASSAY GLUCOSE BLOOD QUANT: CPT

## 2022-01-25 PROCEDURE — 36415 COLL VENOUS BLD VENIPUNCTURE: CPT

## 2022-01-25 PROCEDURE — 83036 HEMOGLOBIN GLYCOSYLATED A1C: CPT

## 2022-01-26 ENCOUNTER — TELEPHONE (OUTPATIENT)
Dept: FAMILY MEDICINE CLINIC | Age: 47
End: 2022-01-26

## 2022-03-28 DIAGNOSIS — N94.6 DYSMENORRHEA, UNSPECIFIED: ICD-10-CM

## 2022-04-05 ENCOUNTER — OFFICE VISIT (OUTPATIENT)
Dept: FAMILY MEDICINE CLINIC | Age: 47
End: 2022-04-05
Payer: COMMERCIAL

## 2022-04-05 VITALS
HEIGHT: 67 IN | TEMPERATURE: 97.7 F | DIASTOLIC BLOOD PRESSURE: 62 MMHG | BODY MASS INDEX: 34.53 KG/M2 | WEIGHT: 220 LBS | OXYGEN SATURATION: 97 % | RESPIRATION RATE: 17 BRPM | SYSTOLIC BLOOD PRESSURE: 132 MMHG | HEART RATE: 87 BPM

## 2022-04-05 DIAGNOSIS — G56.01 RIGHT CARPAL TUNNEL SYNDROME: ICD-10-CM

## 2022-04-05 DIAGNOSIS — Z01.818 PRE-OP EXAMINATION: Primary | ICD-10-CM

## 2022-04-05 DIAGNOSIS — M50.30 DDD (DEGENERATIVE DISC DISEASE), CERVICAL: ICD-10-CM

## 2022-04-05 DIAGNOSIS — I10 ESSENTIAL HYPERTENSION: ICD-10-CM

## 2022-04-05 PROCEDURE — 99214 OFFICE O/P EST MOD 30 MIN: CPT | Performed by: LEGAL MEDICINE

## 2022-04-05 PROCEDURE — 93000 ELECTROCARDIOGRAM COMPLETE: CPT | Performed by: LEGAL MEDICINE

## 2022-04-05 RX ORDER — PREGABALIN 100 MG/1
100 CAPSULE ORAL
Qty: 90 CAPSULE | Refills: 1 | Status: SHIPPED | OUTPATIENT
Start: 2022-04-05 | End: 2022-10-05 | Stop reason: SDUPTHER

## 2022-04-05 NOTE — PROGRESS NOTES
Brian Matamoros     Chief Complaint   Patient presents with    Pre-op Exam     Vitals:    22 1445   BP: 132/62   Pulse: 87   Resp: 17   Temp: 97.7 °F (36.5 °C)   TempSrc: Temporal   SpO2: 97%   Weight: 220 lb (99.8 kg)   Height: 5' 7\" (1.702 m)   PainSc:   0 - No pain   LMP: 2022         HPI: is here for pre op for carpal tunnel syndrome     She is here for pre op   Right Carpal Tunnel release   At [facility] [place] [facility] [address]  Anesthesia MAC/OC     She need CBC ,CMP and EKG        Past Medical History:   Diagnosis Date    Eczema     Hypertension     Nerve pain      Past Surgical History:   Procedure Laterality Date    COLONOSCOPY N/A 3/25/2022    SCREENING COLONOSCOPY /c hot snare polypectomy performed by Harriet Sun MD at [address] [place] [address] HX [address] Left 2019    HX HIP REPLACEMENT Right      Social History     Tobacco Use    Smoking status: Former Smoker     Types: Cigarettes     Quit date: 2020     Years since quittin.9    Smokeless tobacco: Never Used   Substance Use Topics    Alcohol use: Yes       Family History   Problem Relation Age of Onset    Hypertension Mother     Diabetes Father     Hypertension Father     Breast Cancer Maternal Aunt        Review of Systems   Constitutional: Negative for chills, fever, malaise/fatigue and weight loss. HENT: Negative for congestion, ear discharge, ear pain, hearing loss, nosebleeds, sinus pain and sore throat. Eyes: Negative for blurred vision, double vision and discharge. Respiratory: Negative for cough, hemoptysis, sputum production, shortness of breath and wheezing. Cardiovascular: Negative for chest pain, palpitations, claudication and leg swelling. Gastrointestinal: Negative for abdominal pain, constipation, diarrhea, nausea and vomiting. Genitourinary: Negative for dysuria, frequency and urgency. Musculoskeletal: Negative for myalgias.    Skin: Negative for itching and rash. Neurological: Positive for tingling. Negative for dizziness, tremors, sensory change, speech change, focal weakness, weakness and headaches. Psychiatric/Behavioral: Negative for depression and suicidal ideas. Physical Exam  Vitals and nursing note reviewed. Constitutional:       General: She is not in acute distress. Appearance: She is well-developed. She is not diaphoretic. HENT:      Head: Normocephalic and atraumatic. Eyes:      General: No scleral icterus. Right eye: No discharge. Left eye: No discharge. Conjunctiva/sclera: Conjunctivae normal.   Neck:      Thyroid: No thyromegaly. Cardiovascular:      Rate and Rhythm: Normal rate and regular rhythm. Heart sounds: Normal heart sounds. Pulmonary:      Effort: Pulmonary effort is normal. No respiratory distress. Breath sounds: Normal breath sounds. No wheezing or rales. Chest:      Chest wall: No tenderness. Abdominal:      General: There is no distension. Palpations: Abdomen is soft. Tenderness: There is no abdominal tenderness. There is no rebound. Musculoskeletal:         General: Normal range of motion. Right lower leg: No edema. Left lower leg: No edema. Lymphadenopathy:      Cervical: No cervical adenopathy. Skin:     General: Skin is warm and dry. Coloration: Skin is not pale. Findings: No erythema or rash. Neurological:      Mental Status: She is alert and oriented to person, place, and time. Cranial Nerves: No cranial nerve deficit. Coordination: Coordination normal.   Psychiatric:         Behavior: Behavior normal.         Thought Content: Thought content normal.         Judgment: Judgment normal.          Assessment and plan     Plan of care has been discussed with the patient, he agrees to the plan and verbalized understanding.   All his questions were answered  More than 50% of the time spent in this visit was counseling the patient about  illness and treatment options         1. Pre-op examination  Patient is medically cleared for above surgery CBC and CMP are within normal limits medically cleared for surgery  EKG normal sinus rhythm  - AMB POC EKG ROUTINE W/ 12 LEADS, INTER & REP  - CBC WITH AUTOMATED DIFF; Future  - METABOLIC PANEL, COMPREHENSIVE; Future    2. DDD (degenerative disc disease), cervical    - pregabalin (Lyrica) 100 mg capsule; Take 1 Capsule by mouth nightly for 90 days. Max Daily Amount: 100 mg. Dispense: 90 Capsule; Refill: 1    3. Essential hypertension  Blood pressure well controlled on current medications  - AMB POC EKG ROUTINE W/ 12 LEADS, INTER & REP  - CBC WITH AUTOMATED DIFF; Future  - METABOLIC PANEL, COMPREHENSIVE; Future    4. Right carpal tunnel syndrome   planning for surgery as above    Current Outpatient Medications   Medication Sig Dispense Refill    pregabalin (Lyrica) 100 mg capsule Take 1 Capsule by mouth nightly for 90 days. Max Daily Amount: 100 mg. 90 Capsule 1    valsartan (DIOVAN) 40 mg tablet Take 1 Tablet by mouth daily for 90 days. 90 Tablet 3    chlorthalidone (HYGROTON) 25 mg tablet Take 1 Tablet by mouth daily. 90 Tablet 3    betamethasone dipropionate (DIPROSONE) 0.05 % topical cream Apply 1 g to affected area two (2) times a day. 15 g 5    fluticasone propionate (FLONASE) 50 mcg/actuation nasal spray 2 Sprays by Both Nostrils route daily. (Patient taking differently: 2 Sprays by Both Nostrils route as needed.) 1 Bottle 5       There are no problems to display for this patient.     Results for orders placed or performed during the hospital encounter of 03/25/22   POC HCG,URINE   Result Value Ref Range    HCG urine, QL negative NEGATIVE,Negative,negative       Admission on 03/25/2022, Discharged on 03/25/2022   Component Date Value Ref Range Status    HCG urine, QL 03/25/2022 negative  NEGATIVE,Negative,negative   Final   Hospital Outpatient Visit on 01/25/2022   Component Date Value Ref Range Status    Glucose 01/25/2022 101* 74 - 99 mg/dL Final    Hemoglobin A1c 01/25/2022 5.4  4.2 - 5.6 % Final    Comment: (NOTE)  HbA1C Interpretive Ranges  <5.7              Normal  5.7 - 6.4         Consider Prediabetes  >6.5              Consider Diabetes     Hospital Outpatient Visit on 01/17/2022   Component Date Value Ref Range Status    Vitamin B12 01/17/2022 533  211 - 911 pg/mL Final    WBC 01/17/2022 7.5  4.6 - 13.2 K/uL Final    RBC 01/17/2022 4.42  4.20 - 5.30 M/uL Final    HGB 01/17/2022 13.9  12.0 - 16.0 g/dL Final    HCT 01/17/2022 41.4  35.0 - 45.0 % Final    MCV 01/17/2022 93.7  78.0 - 100.0 FL Final    MCH 01/17/2022 31.4  24.0 - 34.0 PG Final    MCHC 01/17/2022 33.6  31.0 - 37.0 g/dL Final    RDW 01/17/2022 12.4  11.6 - 14.5 % Final    PLATELET 13/05/2264 949  135 - 420 K/uL Final    MPV 01/17/2022 10.5  9.2 - 11.8 FL Final    NRBC 01/17/2022 0.0  0  WBC Final    ABSOLUTE NRBC 01/17/2022 0.00  0.00 - 0.01 K/uL Final    NEUTROPHILS 01/17/2022 57  40 - 73 % Final    LYMPHOCYTES 01/17/2022 27  21 - 52 % Final    MONOCYTES 01/17/2022 10  3 - 10 % Final    EOSINOPHILS 01/17/2022 4  0 - 5 % Final    BASOPHILS 01/17/2022 1  0 - 2 % Final    IMMATURE GRANULOCYTES 01/17/2022 0  0.0 - 0.5 % Final    ABS. NEUTROPHILS 01/17/2022 4.3  1.8 - 8.0 K/UL Final    ABS. LYMPHOCYTES 01/17/2022 2.0  0.9 - 3.6 K/UL Final    ABS. MONOCYTES 01/17/2022 0.8  0.05 - 1.2 K/UL Final    ABS. EOSINOPHILS 01/17/2022 0.3  0.0 - 0.4 K/UL Final    ABS. BASOPHILS 01/17/2022 0.1  0.0 - 0.1 K/UL Final    ABS. IMM. GRANS. 01/17/2022 0.0  0.00 - 0.04 K/UL Final    DF 01/17/2022 AUTOMATED    Final    LIPID PROFILE 01/17/2022        Final    Cholesterol, total 01/17/2022 200* <200 MG/DL Final    Triglyceride 01/17/2022 364* <150 MG/DL Final    Comment: The drugs N-acetylcysteine (NAC) and  Metamiszole have been found to cause falsely  low results in this chemical assay.  Please  be sure to submit blood samples obtained  BEFORE administration of either of these  drugs to assure correct results.  HDL Cholesterol 01/17/2022 28* 40 - 60 MG/DL Final    LDL, calculated 01/17/2022 99.2  0 - 100 MG/DL Final    VLDL, calculated 01/17/2022 72.8  MG/DL Final    CHOL/HDL Ratio 01/17/2022 7.1* 0 - 5.0   Final    Sodium 01/17/2022 137  136 - 145 mmol/L Final    Potassium 01/17/2022 3.8  3.5 - 5.5 mmol/L Final    Chloride 01/17/2022 102  100 - 111 mmol/L Final    CO2 01/17/2022 29  21 - 32 mmol/L Final    Anion gap 01/17/2022 6  3.0 - 18 mmol/L Final    Glucose 01/17/2022 114* 74 - 99 mg/dL Final    BUN 01/17/2022 12  7.0 - 18 MG/DL Final    Creatinine 01/17/2022 0.68  0.6 - 1.3 MG/DL Final    BUN/Creatinine ratio 01/17/2022 18  12 - 20   Final    GFR est AA 01/17/2022 >60  >60 ml/min/1.73m2 Final    GFR est non-AA 01/17/2022 >60  >60 ml/min/1.73m2 Final    Comment: (NOTE)  Estimated GFR is calculated using the Modification of Diet in Renal   Disease (MDRD) Study equation, reported for both  Americans   (GFRAA) and non- Americans (GFRNA), and normalized to 1.73m2   body surface area. The physician must decide which value applies to   the patient. The MDRD study equation should only be used in   individuals age 25 or older. It has not been validated for the   following: pregnant women, patients with serious comorbid conditions,   or on certain medications, or persons with extremes of body size,   muscle mass, or nutritional status.  Calcium 01/17/2022 9.1  8.5 - 10.1 MG/DL Final    Bilirubin, total 01/17/2022 0.5  0.2 - 1.0 MG/DL Final    ALT (SGPT) 01/17/2022 47  13 - 56 U/L Final    AST (SGOT) 01/17/2022 27  10 - 38 U/L Final    Alk.  phosphatase 01/17/2022 63  45 - 117 U/L Final    Protein, total 01/17/2022 6.9  6.4 - 8.2 g/dL Final    Albumin 01/17/2022 3.8  3.4 - 5.0 g/dL Final    Globulin 01/17/2022 3.1  2.0 - 4.0 g/dL Final    A-G Ratio 01/17/2022 1.2  0.8 - 1.7 Final    TSH 01/17/2022 1.69  0.36 - 3.74 uIU/mL Final    T4, Free 01/17/2022 0.9  0.7 - 1.5 NG/DL Final    Vitamin D 25-Hydroxy 01/17/2022 23.2* 30 - 100 ng/mL Final    Comment: (NOTE)  Deficiency               <20 ng/mL  Insufficiency          20-30 ng/mL  Sufficient             ng/mL  Possible toxicity       >100 ng/mL    The Method used is Siemens Advia Centaur currently standardized to a   Center of Disease Control and Prevention (CDC) certified reference   [facility]. Samples containing fluorescein dye can produce falsely   elevated values when tested with the ADVIA Centaur Vitamin D Assay. It is recommended that results in the toxic range, >100 ng/mL, be   retested 72 hours post fluorescein exposure.  Hepatitis C virus Ab 01/17/2022 0.02  <0.80 Index Final    Hep C virus Ab Interp. 01/17/2022 Negative  NEG   Final    Hep C  virus Ab comment 01/17/2022        Final    Comment: Index <0.80. ......................... Thurl Mutton Negative  Index > or = to 0.80 and <1.00. .... Thurl Mutton Thurl Mutton Equivocal  Index >1.00. ......................... Thurl Mutton Positive          For Equivocal or Positive results, confirmation with Hepatitis C RNA by PCR or bDNA is suggested.

## 2022-04-05 NOTE — PROGRESS NOTES
1. \"Have you been to the ER, urgent care clinic since your last visit? Hospitalized since your last visit? \" No    2. \"Have you seen or consulted any other health care providers outside of the [address] [place] [address] since your last visit? \" No     3. For patients aged 39-70: Has the patient had a colonoscopy / FIT/ Cologuard? Yes - no Care Gap present      If the patient is female:    4. For patients aged 41-77: Has the patient had a mammogram within the past 2 years? Yes - no Care Gap present      5. For patients aged 21-65: Has the patient had a pap smear?  Yes - no Care Gap present

## 2022-04-07 ENCOUNTER — APPOINTMENT (OUTPATIENT)
Dept: FAMILY MEDICINE CLINIC | Age: 47
End: 2022-04-07

## 2022-04-07 ENCOUNTER — HOSPITAL ENCOUNTER (OUTPATIENT)
Dept: LAB | Age: 47
Discharge: HOME | End: 2022-04-07
Payer: COMMERCIAL

## 2022-04-07 DIAGNOSIS — I10 ESSENTIAL HYPERTENSION: ICD-10-CM

## 2022-04-07 DIAGNOSIS — Z01.818 PRE-OP EXAMINATION: ICD-10-CM

## 2022-04-07 LAB
ALBUMIN SERPL-MCNC: 4.1 G/DL (ref 3.4–5)
ALBUMIN/GLOB SERPL: 1.2 {RATIO} (ref 0.8–1.7)
ALP SERPL-CCNC: 58 U/L (ref 45–117)
ALT SERPL-CCNC: 41 U/L (ref 13–56)
ANION GAP SERPL CALC-SCNC: 6 MMOL/L (ref 3–18)
AST SERPL-CCNC: 18 U/L (ref 10–38)
BASOPHILS # BLD: 0.1 K/UL (ref 0–0.1)
BASOPHILS NFR BLD: 1 % (ref 0–2)
BILIRUB SERPL-MCNC: 0.3 MG/DL (ref 0.2–1)
BUN SERPL-MCNC: 14 MG/DL (ref 7–18)
BUN/CREAT SERPL: 20 (ref 12–20)
CALCIUM SERPL-MCNC: 9.5 MG/DL (ref 8.5–10.1)
CHLORIDE SERPL-SCNC: 102 MMOL/L (ref 100–111)
CO2 SERPL-SCNC: 29 MMOL/L (ref 21–32)
CREAT SERPL-MCNC: 0.7 MG/DL (ref 0.6–1.3)
DIFFERENTIAL METHOD BLD: NORMAL
EOSINOPHIL # BLD: 0.3 K/UL (ref 0–0.4)
EOSINOPHIL NFR BLD: 4 % (ref 0–5)
ERYTHROCYTE [DISTWIDTH] IN BLOOD BY AUTOMATED COUNT: 12.4 % (ref 11.6–14.5)
GLOBULIN SER CALC-MCNC: 3.4 G/DL (ref 2–4)
GLUCOSE SERPL-MCNC: 90 MG/DL (ref 74–99)
HCT VFR BLD AUTO: 42.4 % (ref 35–45)
HGB BLD-MCNC: 14.1 G/DL (ref 12–16)
IMM GRANULOCYTES # BLD AUTO: 0 K/UL (ref 0–0.04)
IMM GRANULOCYTES NFR BLD AUTO: 0 % (ref 0–0.5)
LYMPHOCYTES # BLD: 2.7 K/UL (ref 0.9–3.6)
LYMPHOCYTES NFR BLD: 33 % (ref 21–52)
MCH RBC QN AUTO: 31.5 PG (ref 24–34)
MCHC RBC AUTO-ENTMCNC: 33.3 G/DL (ref 31–37)
MCV RBC AUTO: 94.6 FL (ref 78–100)
MONOCYTES # BLD: 0.9 K/UL (ref 0.05–1.2)
MONOCYTES NFR BLD: 10 % (ref 3–10)
NEUTS SEG # BLD: 4.3 K/UL (ref 1.8–8)
NEUTS SEG NFR BLD: 52 % (ref 40–73)
NRBC # BLD: 0 K/UL (ref 0–0.01)
NRBC BLD-RTO: 0 PER 100 WBC
PLATELET # BLD AUTO: 340 K/UL (ref 135–420)
PMV BLD AUTO: 10.2 FL (ref 9.2–11.8)
POTASSIUM SERPL-SCNC: 3.9 MMOL/L (ref 3.5–5.5)
PROT SERPL-MCNC: 7.5 G/DL (ref 6.4–8.2)
RBC # BLD AUTO: 4.48 M/UL (ref 4.2–5.3)
SODIUM SERPL-SCNC: 137 MMOL/L (ref 136–145)
WBC # BLD AUTO: 8.3 K/UL (ref 4.6–13.2)

## 2022-04-07 PROCEDURE — 80053 COMPREHEN METABOLIC PANEL: CPT

## 2022-04-07 PROCEDURE — 85025 COMPLETE CBC W/AUTO DIFF WBC: CPT

## 2022-04-07 PROCEDURE — 36415 COLL VENOUS BLD VENIPUNCTURE: CPT

## 2022-10-05 DIAGNOSIS — M50.30 DDD (DEGENERATIVE DISC DISEASE), CERVICAL: ICD-10-CM

## 2022-10-05 NOTE — TELEPHONE ENCOUNTER
Last seen 04/05/22    Lyrica 100mg 90 tabs with 1 refill  Last filled 04/05/22    Future Appointments   Date Time Provider Jayro Siria   11/2/2022  1:00 PM Zain Oliveros MD [facility] AMB

## 2022-10-06 RX ORDER — PREGABALIN 100 MG/1
CAPSULE ORAL
Qty: 90 CAPSULE | OUTPATIENT
Start: 2022-10-06

## 2022-10-06 RX ORDER — PREGABALIN 100 MG/1
100 CAPSULE ORAL
Qty: 90 CAPSULE | Refills: 0 | Status: SHIPPED | OUTPATIENT
Start: 2022-10-06 | End: 2023-01-04

## 2022-11-09 ENCOUNTER — OFFICE VISIT (OUTPATIENT)
Dept: FAMILY MEDICINE CLINIC | Age: 47
End: 2022-11-09
Payer: COMMERCIAL

## 2022-11-09 VITALS
HEIGHT: 67 IN | WEIGHT: 222.8 LBS | OXYGEN SATURATION: 96 % | BODY MASS INDEX: 34.97 KG/M2 | HEART RATE: 74 BPM | TEMPERATURE: 98.3 F | RESPIRATION RATE: 15 BRPM | DIASTOLIC BLOOD PRESSURE: 68 MMHG | SYSTOLIC BLOOD PRESSURE: 132 MMHG

## 2022-11-09 DIAGNOSIS — R42 DIZZINESSES: ICD-10-CM

## 2022-11-09 DIAGNOSIS — G89.29 CHRONIC BILATERAL LOW BACK PAIN, UNSPECIFIED WHETHER SCIATICA PRESENT: ICD-10-CM

## 2022-11-09 DIAGNOSIS — F41.1 GAD (GENERALIZED ANXIETY DISORDER): ICD-10-CM

## 2022-11-09 DIAGNOSIS — R22.42 LOCALIZED SWELLING OF LEFT LOWER LEG: ICD-10-CM

## 2022-11-09 DIAGNOSIS — G44.52 NEW PERSISTENT DAILY HEADACHE: Primary | ICD-10-CM

## 2022-11-09 DIAGNOSIS — M54.50 CHRONIC BILATERAL LOW BACK PAIN, UNSPECIFIED WHETHER SCIATICA PRESENT: ICD-10-CM

## 2022-11-09 DIAGNOSIS — I10 ESSENTIAL HYPERTENSION: ICD-10-CM

## 2022-11-09 PROCEDURE — 3078F DIAST BP <80 MM HG: CPT | Performed by: LEGAL MEDICINE

## 2022-11-09 PROCEDURE — 99214 OFFICE O/P EST MOD 30 MIN: CPT | Performed by: LEGAL MEDICINE

## 2022-11-09 PROCEDURE — 3074F SYST BP LT 130 MM HG: CPT | Performed by: LEGAL MEDICINE

## 2022-11-09 RX ORDER — CHLORTHALIDONE 25 MG/1
25 TABLET ORAL DAILY
Qty: 90 TABLET | Refills: 3 | Status: SHIPPED | OUTPATIENT
Start: 2022-11-09

## 2022-11-09 RX ORDER — VALSARTAN 40 MG/1
40 TABLET ORAL DAILY
Qty: 90 TABLET | Refills: 3 | Status: SHIPPED | OUTPATIENT
Start: 2022-11-09 | End: 2023-02-07

## 2022-11-09 RX ORDER — BUSPIRONE HYDROCHLORIDE 10 MG/1
10 TABLET ORAL
Qty: 90 TABLET | Refills: 0 | Status: SHIPPED | OUTPATIENT
Start: 2022-11-09 | End: 2023-02-07

## 2022-11-09 NOTE — PROGRESS NOTES
Maricruz Cousins     Chief Complaint   Patient presents with    Hypertension     Vitals:    22 1347   BP: 132/68   Pulse: 74   Resp: 15   Temp: 98.3 °F (36.8 °C)   TempSrc: Temporal   SpO2: 96%   Weight: 222 lb 12.8 oz (101.1 kg)   Height: 5' 7\" (1.702 m)   PainSc:   7   PainLoc: Head   LMP: 10/27/2022         HPI: Antonino Puente is here for follow up BP ,it is well controlled   Has been having dizziness associated with head pressure on and off worse by standing and moving , it last for few minutes but she has daily headaches for 4 month       Past Medical History:   Diagnosis Date    Eczema     Hypertension     Nerve pain      Past Surgical History:   Procedure Laterality Date    COLONOSCOPY N/A 3/25/2022    SCREENING COLONOSCOPY /c hot snare polypectomy performed by Kathe Wang MD at [address] Left 2019    HX HIP REPLACEMENT Right      Social History     Tobacco Use    Smoking status: Former     Types: Cigarettes     Quit date: 2020     Years since quittin.5    Smokeless tobacco: Current    Tobacco comments:     Pt is vaping   Substance Use Topics    Alcohol use: Yes       Family History   Problem Relation Age of Onset    Hypertension Mother     Diabetes Father     Hypertension Father     Breast Cancer Maternal Aunt        Review of Systems   Constitutional:  Negative for chills, fever, malaise/fatigue and weight loss. HENT:  Negative for congestion, ear discharge, ear pain, hearing loss, nosebleeds, sinus pain and sore throat. Eyes:  Negative for blurred vision, double vision and discharge. Respiratory:  Negative for cough, hemoptysis, sputum production, shortness of breath, wheezing and stridor. Cardiovascular:  Negative for chest pain, palpitations, claudication and leg swelling. Gastrointestinal:  Negative for abdominal pain, blood in stool, constipation, diarrhea, nausea and vomiting.    Genitourinary:  Negative for dysuria, frequency and urgency. Musculoskeletal:  Positive for back pain, myalgias and neck pain. Negative for falls and joint pain. Skin:  Negative for itching and rash. Neurological:  Positive for dizziness and headaches. Negative for tingling, tremors, sensory change, speech change, focal weakness, seizures, loss of consciousness and weakness. Psychiatric/Behavioral:  Negative for depression and suicidal ideas. Physical Exam  Vitals and nursing note reviewed. Constitutional:       General: She is not in acute distress. Appearance: She is well-developed. She is not diaphoretic. HENT:      Head: Normocephalic and atraumatic. Right Ear: Tympanic membrane, ear canal and external ear normal. There is no impacted cerumen. Left Ear: Tympanic membrane, ear canal and external ear normal.      Nose: No congestion. Mouth/Throat:      Pharynx: No oropharyngeal exudate. Eyes:      General: No scleral icterus. Right eye: No discharge. Left eye: No discharge. Conjunctiva/sclera: Conjunctivae normal.      Pupils: Pupils are equal, round, and reactive to light. Neck:      Thyroid: No thyromegaly. Cardiovascular:      Rate and Rhythm: Normal rate and regular rhythm. Heart sounds: Normal heart sounds. No murmur heard. Pulmonary:      Effort: Pulmonary effort is normal. No respiratory distress. Breath sounds: Normal breath sounds. No wheezing or rales. Chest:      Chest wall: No tenderness. Abdominal:      General: There is no distension. Palpations: Abdomen is soft. Tenderness: There is no abdominal tenderness. There is no guarding or rebound. Musculoskeletal:         General: Tenderness present. No deformity. Normal range of motion. Lymphadenopathy:      Cervical: No cervical adenopathy. Skin:     General: Skin is warm and dry. Coloration: Skin is not pale. Findings: No erythema or rash.    Neurological:      Mental Status: She is alert and oriented to person, place, and time. Cranial Nerves: No cranial nerve deficit. Motor: No weakness. Coordination: Coordination normal.   Psychiatric:         Mood and Affect: Mood normal.         Behavior: Behavior normal.         Thought Content: Thought content normal.         Judgment: Judgment normal.        Assessment and plan     Plan of care has been discussed with the patient, he agrees to the plan and verbalized understanding. All his questions were answered  More than 50% of the time spent in this visit was counseling the patient about  illness and treatment options         1. New persistent daily headache  To get a CT scan for persistent daily headache-new onset  - CT HEAD WO CONT; Future    2. Essential hypertension  Blood pressures well controlled on Diovan 40 mg daily and chlorthalidone 25 mg daily  - valsartan (DIOVAN) 40 mg tablet; Take 1 Tablet by mouth daily for 90 days. Dispense: 90 Tablet; Refill: 3  - chlorthalidone (HYGROTON) 25 mg tablet; Take 1 Tablet by mouth daily. Dispense: 90 Tablet; Refill: 3    3. Dizzinesses      4. Chronic bilateral low back pain, unspecified whether sciatica present    - XR SPINE LUMB MIN 4 V; Future    5. SAPNA (generalized anxiety disorder)  She takes BuSpar as needed  - busPIRone (BUSPAR) 10 mg tablet; Take 1 Tablet by mouth two (2) times daily as needed for PRN Reason (Other) (anxiety) for up to 90 days. Dispense: 90 Tablet; Refill: 0    6. Localized swelling of left lower leg    - chlorthalidone (HYGROTON) 25 mg tablet; Take 1 Tablet by mouth daily. Dispense: 90 Tablet; Refill: 3  Current Outpatient Medications   Medication Sig Dispense Refill    busPIRone (BUSPAR) 10 mg tablet Take 1 Tablet by mouth two (2) times daily as needed for PRN Reason (Other) (anxiety) for up to 90 days. 90 Tablet 0    valsartan (DIOVAN) 40 mg tablet Take 1 Tablet by mouth daily for 90 days. 90 Tablet 3    chlorthalidone (HYGROTON) 25 mg tablet Take 1 Tablet by mouth daily.  80 Tablet 3    pregabalin (Lyrica) 100 mg capsule Take 1 Capsule by mouth nightly for 90 days. Max Daily Amount: 100 mg. 90 Capsule 0    betamethasone dipropionate (DIPROSONE) 0.05 % topical cream Apply 1 g to affected area two (2) times a day. 15 g 5    fluticasone propionate (FLONASE) 50 mcg/actuation nasal spray 2 Sprays by Both Nostrils route daily. (Patient taking differently: 2 Sprays by Both Nostrils route as needed.) 1 Bottle 5       There are no problems to display for this patient. Results for orders placed or performed during the hospital encounter of 04/07/22   CBC WITH AUTOMATED DIFF   Result Value Ref Range    WBC 8.3 4.6 - 13.2 K/uL    RBC 4.48 4.20 - 5.30 M/uL    HGB 14.1 12.0 - 16.0 g/dL    HCT 42.4 35.0 - 45.0 %    MCV 94.6 78.0 - 100.0 FL    MCH 31.5 24.0 - 34.0 PG    MCHC 33.3 31.0 - 37.0 g/dL    RDW 12.4 11.6 - 14.5 %    PLATELET 726 689 - 310 K/uL    MPV 10.2 9.2 - 11.8 FL    NRBC 0.0 0  WBC    ABSOLUTE NRBC 0.00 0.00 - 0.01 K/uL    NEUTROPHILS 52 40 - 73 %    LYMPHOCYTES 33 21 - 52 %    MONOCYTES 10 3 - 10 %    EOSINOPHILS 4 0 - 5 %    BASOPHILS 1 0 - 2 %    IMMATURE GRANULOCYTES 0 0.0 - 0.5 %    ABS. NEUTROPHILS 4.3 1.8 - 8.0 K/UL    ABS. LYMPHOCYTES 2.7 0.9 - 3.6 K/UL    ABS. MONOCYTES 0.9 0.05 - 1.2 K/UL    ABS. EOSINOPHILS 0.3 0.0 - 0.4 K/UL    ABS. BASOPHILS 0.1 0.0 - 0.1 K/UL    ABS. IMM. GRANS. 0.0 0.00 - 0.04 K/UL    DF AUTOMATED     METABOLIC PANEL, COMPREHENSIVE   Result Value Ref Range    Sodium 137 136 - 145 mmol/L    Potassium 3.9 3.5 - 5.5 mmol/L    Chloride 102 100 - 111 mmol/L    CO2 29 21 - 32 mmol/L    Anion gap 6 3.0 - 18 mmol/L    Glucose 90 74 - 99 mg/dL    BUN 14 7.0 - 18 MG/DL    Creatinine 0.70 0.6 - 1.3 MG/DL    BUN/Creatinine ratio 20 12 - 20      GFR est AA >60 >60 ml/min/1.73m2    GFR est non-AA >60 >60 ml/min/1.73m2    Calcium 9.5 8.5 - 10.1 MG/DL    Bilirubin, total 0.3 0.2 - 1.0 MG/DL    ALT (SGPT) 41 13 - 56 U/L    AST (SGOT) 18 10 - 38 U/L    Alk. phosphatase 58 45 - 117 U/L    Protein, total 7.5 6.4 - 8.2 g/dL    Albumin 4.1 3.4 - 5.0 g/dL    Globulin 3.4 2.0 - 4.0 g/dL    A-G Ratio 1.2 0.8 - 1.7       No visits with results within 3 Month(s) from this visit. Latest known visit with results is:   Hospital Outpatient Visit on 04/07/2022   Component Date Value Ref Range Status    WBC 04/07/2022 8.3  4.6 - 13.2 K/uL Final    RBC 04/07/2022 4.48  4.20 - 5.30 M/uL Final    HGB 04/07/2022 14.1  12.0 - 16.0 g/dL Final    HCT 04/07/2022 42.4  35.0 - 45.0 % Final    MCV 04/07/2022 94.6  78.0 - 100.0 FL Final    MCH 04/07/2022 31.5  24.0 - 34.0 PG Final    MCHC 04/07/2022 33.3  31.0 - 37.0 g/dL Final    RDW 04/07/2022 12.4  11.6 - 14.5 % Final    PLATELET 98/97/4487 860  135 - 420 K/uL Final    MPV 04/07/2022 10.2  9.2 - 11.8 FL Final    NRBC 04/07/2022 0.0  0  WBC Final    ABSOLUTE NRBC 04/07/2022 0.00  0.00 - 0.01 K/uL Final    NEUTROPHILS 04/07/2022 52  40 - 73 % Final    LYMPHOCYTES 04/07/2022 33  21 - 52 % Final    MONOCYTES 04/07/2022 10  3 - 10 % Final    EOSINOPHILS 04/07/2022 4  0 - 5 % Final    BASOPHILS 04/07/2022 1  0 - 2 % Final    IMMATURE GRANULOCYTES 04/07/2022 0  0.0 - 0.5 % Final    ABS. NEUTROPHILS 04/07/2022 4.3  1.8 - 8.0 K/UL Final    ABS. LYMPHOCYTES 04/07/2022 2.7  0.9 - 3.6 K/UL Final    ABS. MONOCYTES 04/07/2022 0.9  0.05 - 1.2 K/UL Final    ABS. EOSINOPHILS 04/07/2022 0.3  0.0 - 0.4 K/UL Final    ABS. BASOPHILS 04/07/2022 0.1  0.0 - 0.1 K/UL Final    ABS. IMM.  GRANS. 04/07/2022 0.0  0.00 - 0.04 K/UL Final    DF 04/07/2022 AUTOMATED    Final    Sodium 04/07/2022 137  136 - 145 mmol/L Final    Potassium 04/07/2022 3.9  3.5 - 5.5 mmol/L Final    Chloride 04/07/2022 102  100 - 111 mmol/L Final    CO2 04/07/2022 29  21 - 32 mmol/L Final    Anion gap 04/07/2022 6  3.0 - 18 mmol/L Final    Glucose 04/07/2022 90  74 - 99 mg/dL Final    BUN 04/07/2022 14  7.0 - 18 MG/DL Final    Creatinine 04/07/2022 0.70  0.6 - 1.3 MG/DL Final BUN/Creatinine ratio 04/07/2022 20  12 - 20   Final    GFR est AA 04/07/2022 >60  >60 ml/min/1.73m2 Final    GFR est non-AA 04/07/2022 >60  >60 ml/min/1.73m2 Final    Comment: (NOTE)  Estimated GFR is calculated using the Modification of Diet in Renal   Disease (MDRD) Study equation, reported for both  Americans   (GFRAA) and non- Americans (GFRNA), and normalized to 1.73m2   body surface area. The physician must decide which value applies to   the patient. The MDRD study equation should only be used in   individuals age 25 or older. It has not been validated for the   following: pregnant women, patients with serious comorbid conditions,   or on certain medications, or persons with extremes of body size,   muscle mass, or nutritional status. Calcium 04/07/2022 9.5  8.5 - 10.1 MG/DL Final    Bilirubin, total 04/07/2022 0.3  0.2 - 1.0 MG/DL Final    ALT (SGPT) 04/07/2022 41  13 - 56 U/L Final    AST (SGOT) 04/07/2022 18  10 - 38 U/L Final    Alk.  phosphatase 04/07/2022 58  45 - 117 U/L Final    Protein, total 04/07/2022 7.5  6.4 - 8.2 g/dL Final    Albumin 04/07/2022 4.1  3.4 - 5.0 g/dL Final    Globulin 04/07/2022 3.4  2.0 - 4.0 g/dL Final    A-G Ratio 04/07/2022 1.2  0.8 - 1.7   Final          Follow-up and Dispositions    Return in about 2 months (around 1/9/2023) for for annual physical.

## 2022-11-09 NOTE — PROGRESS NOTES
Jason Paul is a 52 y.o. female (: 1975) presenting to address:    Chief Complaint   Patient presents with    Hypertension       Vitals:    22 1347   BP: 132/68   Pulse: 74   Resp: 15   Temp: 98.3 °F (36.8 °C)   TempSrc: Temporal   SpO2: 96%   Weight: 222 lb 12.8 oz (101.1 kg)   Height: 5' 7\" (1.702 m)   PainSc:   7   PainLoc: Head   LMP: 10/27/2022       Hearing/Vision:   No results found. Learning Assessment:     Learning Assessment 10/28/2019   PRIMARY LEARNER Patient   PRIMARY LANGUAGE ENGLISH   LEARNER PREFERENCE PRIMARY PICTURES     LISTENING     READING     DEMONSTRATION     VIDEOS   ANSWERED BY patient   RELATIONSHIP SELF     Depression Screening:     3 most recent PHQ Screens 2022   Little interest or pleasure in doing things Not at all   Feeling down, depressed, irritable, or hopeless Not at all   Total Score PHQ 2 0     Fall Risk Assessment:     Fall Risk Assessment, last 12 mths 10/28/2019   Able to walk? Yes   Fall in past 12 months? No     Abuse Screening:     Abuse Screening Questionnaire 1/3/2022   Do you ever feel afraid of your partner? N   Are you in a relationship with someone who physically or mentally threatens you? N   Is it safe for you to go home?  Y     ADL Assessment:     ADL Assessment 10/28/2019   Feeding yourself No Help Needed   Getting from bed to chair No Help Needed   Getting dressed No Help Needed   Bathing or showering No Help Needed   Walk across the room (includes cane/walker) No Help Needed   Using the telphone No Help Needed   Taking your medications No Help Needed   Preparing meals No Help Needed   Managing money (expenses/bills) No Help Needed   Moderately strenuous housework (laundry) No Help Needed   Shopping for personal items (toiletries/medicines) No Help Needed   Shopping for groceries No Help Needed   Driving No Help Needed   Climbing a flight of stairs No Help Needed   Getting to places beyond walking distances No Help Needed        Coordination of Care Questionaire:   1. \"Have you been to the ER, urgent care clinic since your last visit? Hospitalized since your last visit? \" No    2. \"Have you seen or consulted any other health care providers outside of the [address] [place] [address] since your last visit? \"  Carpal tunnel right hand 4/2022      3. For patients aged 39-70: Has the patient had a colonoscopy / FIT/ Cologuard? Yes - no Care Gap present    If the patient is female:    4. For patients aged 41-77: Has the patient had a mammogram within the past 2 years? Yes - no Care Gap present  See top three    5. For patients aged 21-65: Has the patient had a pap smear? Yes - no Care Gap present    Advanced Directive:   1. Do you have an Advanced Directive? NO    2. Would you like information on Advanced Directives?  NO

## 2022-12-01 ENCOUNTER — TELEPHONE (OUTPATIENT)
Dept: FAMILY MEDICINE CLINIC | Age: 47
End: 2022-12-01

## 2022-12-05 ENCOUNTER — OFFICE VISIT (OUTPATIENT)
Dept: FAMILY MEDICINE CLINIC | Age: 47
End: 2022-12-05
Payer: COMMERCIAL

## 2022-12-05 VITALS
TEMPERATURE: 98.3 F | SYSTOLIC BLOOD PRESSURE: 130 MMHG | DIASTOLIC BLOOD PRESSURE: 68 MMHG | HEIGHT: 67 IN | OXYGEN SATURATION: 96 % | RESPIRATION RATE: 14 BRPM | HEART RATE: 80 BPM | WEIGHT: 218.8 LBS | BODY MASS INDEX: 34.34 KG/M2

## 2022-12-05 DIAGNOSIS — G44.52 NEW PERSISTENT DAILY HEADACHE: ICD-10-CM

## 2022-12-05 DIAGNOSIS — Z23 NEEDS FLU SHOT: ICD-10-CM

## 2022-12-05 DIAGNOSIS — R42 DIZZINESSES: ICD-10-CM

## 2022-12-05 DIAGNOSIS — E34.8 CYST OF PINEAL GLAND: Primary | ICD-10-CM

## 2022-12-05 PROCEDURE — 90471 IMMUNIZATION ADMIN: CPT | Performed by: LEGAL MEDICINE

## 2022-12-05 PROCEDURE — 99213 OFFICE O/P EST LOW 20 MIN: CPT | Performed by: LEGAL MEDICINE

## 2022-12-05 PROCEDURE — 90686 IIV4 VACC NO PRSV 0.5 ML IM: CPT | Performed by: LEGAL MEDICINE

## 2022-12-05 NOTE — PROGRESS NOTES
Kait Munson is a 52 y.o. female (: 1975) presenting to address:    Chief Complaint   Patient presents with    Results     Discuss MRI results       Vitals:    22 0820   BP: 130/68   Pulse: 80   Resp: 14   Temp: 98.3 °F (36.8 °C)   TempSrc: Temporal   SpO2: 96%   Weight: 218 lb 12.8 oz (99.2 kg)   Height: 5' 7\" (1.702 m)   PainSc:   0 - No pain   LMP: 2022       Hearing/Vision:   No results found. Learning Assessment:     Learning Assessment 10/28/2019   PRIMARY LEARNER Patient   PRIMARY LANGUAGE ENGLISH   LEARNER PREFERENCE PRIMARY PICTURES     LISTENING     READING     DEMONSTRATION     VIDEOS   ANSWERED BY patient   RELATIONSHIP SELF     Depression Screening:     3 most recent PHQ Screens 2022   Little interest or pleasure in doing things Not at all   Feeling down, depressed, irritable, or hopeless Not at all   Total Score PHQ 2 0     Fall Risk Assessment:     Fall Risk Assessment, last 12 mths 2022   Able to walk? Yes   Fall in past 12 months? 0   Do you feel unsteady? 0   Are you worried about falling 0     Abuse Screening:     Abuse Screening Questionnaire 2022   Do you ever feel afraid of your partner? N   Are you in a relationship with someone who physically or mentally threatens you? N   Is it safe for you to go home?  Y     ADL Assessment:     ADL Assessment 10/28/2019   Feeding yourself No Help Needed   Getting from bed to chair No Help Needed   Getting dressed No Help Needed   Bathing or showering No Help Needed   Walk across the room (includes cane/walker) No Help Needed   Using the telphone No Help Needed   Taking your medications No Help Needed   Preparing meals No Help Needed   Managing money (expenses/bills) No Help Needed   Moderately strenuous housework (laundry) No Help Needed   Shopping for personal items (toiletries/medicines) No Help Needed   Shopping for groceries No Help Needed   Driving No Help Needed   Climbing a flight of stairs No Help Needed Getting to places beyond walking distances No Help Needed        Coordination of Care Questionaire:   1. \"Have you been to the ER, urgent care clinic since your last visit? Hospitalized since your last visit? \" No    2. \"Have you seen or consulted any other health care providers outside of the [address] [place] [address] since your last visit? \" No     3. For patients aged 39-70: Has the patient had a colonoscopy / FIT/ Cologuard? Yes - no Care Gap present    If the patient is female:    4. For patients aged 41-77: Has the patient had a mammogram within the past 2 years? Yes - no Care Gap present  See top three    5. For patients aged 21-65: Has the patient had a pap smear? Yes - no Care Gap present    Advanced Directive:   1. Do you have an Advanced Directive? NO    2. Would you like information on Advanced Directives? NO    Immunization/s  of the flu vaccine was administered 12/5/2022 by Jaqueline Perkins LPN. Patient tolerated procedure well. No reactions noted.

## 2022-12-05 NOTE — PROGRESS NOTES
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   radiologist recommended MRI with and without contrast  Patient is still experiencing daily headaches to take Tylenol as needed    Past Medical History:   Diagnosis Date    Eczema     Hypertension     Nerve pain      Past Surgical History:   Procedure Laterality Date    COLONOSCOPY N/A 3/25/2022    SCREENING COLONOSCOPY /c hot snare polypectomy performed by Sriram Whitney MD at [address] Left 2019    HX HIP REPLACEMENT Right      Social History     Tobacco Use    Smoking status: Former     Types: Cigarettes     Quit date: 2020     Years since quittin.5    Smokeless tobacco: Current    Tobacco comments:     Pt is vaping   Substance Use Topics    Alcohol use: Yes       Family History   Problem Relation Age of Onset    Hypertension Mother     Diabetes Father     Hypertension Father     Breast Cancer Maternal Aunt        Review of Systems   Constitutional:  Negative for chills, fever, malaise/fatigue and weight loss. HENT:  Negative for congestion, ear discharge, ear pain, hearing loss, nosebleeds, sinus pain and sore throat. Eyes:  Negative for blurred vision, double vision and discharge. Respiratory:  Negative for cough, hemoptysis, sputum production, shortness of breath, wheezing and stridor. Cardiovascular:  Negative for chest pain, palpitations, claudication and leg swelling.    Gastrointestinal:  Negative for abdominal pain, constipation, diarrhea, nausea and vomiting. Genitourinary:  Negative for dysuria, frequency and urgency. Musculoskeletal:  Negative for back pain, falls, joint pain, myalgias and neck pain. Skin:  Negative for itching and rash. Neurological:  Positive for dizziness and headaches. Negative for tingling, sensory change, speech change, focal weakness and weakness. Psychiatric/Behavioral:  Negative for depression, hallucinations, substance abuse and suicidal ideas. The patient has insomnia. The patient is not nervous/anxious. Physical Exam  Vitals and nursing note reviewed. Constitutional:       General: She is not in acute distress. Appearance: She is well-developed. She is not diaphoretic. HENT:      Head: Normocephalic and atraumatic. Eyes:      General: No scleral icterus. Right eye: No discharge. Left eye: No discharge. Conjunctiva/sclera: Conjunctivae normal.      Pupils: Pupils are equal, round, and reactive to light. Neck:      Thyroid: No thyromegaly. Cardiovascular:      Rate and Rhythm: Normal rate and regular rhythm. Heart sounds: Normal heart sounds. No murmur heard. Pulmonary:      Effort: Pulmonary effort is normal. No respiratory distress. Breath sounds: Normal breath sounds. No wheezing or rales. Chest:      Chest wall: No tenderness. Abdominal:      General: There is no distension. Palpations: Abdomen is soft. Tenderness: There is no abdominal tenderness. There is no rebound. Musculoskeletal:         General: No tenderness or deformity. Normal range of motion. Right lower leg: No edema. Left lower leg: No edema. Lymphadenopathy:      Cervical: No cervical adenopathy. Skin:     General: Skin is warm and dry. Coloration: Skin is not pale. Findings: No erythema or rash. Neurological:      Mental Status: She is alert and oriented to person, place, and time. Cranial Nerves: No cranial nerve deficit.       Coordination: Coordination normal.   Psychiatric:         Mood and Affect: Mood normal.         Behavior: Behavior normal.         Thought Content: Thought content normal.         Judgment: Judgment normal.        Assessment and plan     Plan of care has been discussed with the patient, he agrees to the plan and verbalized understanding. All his questions were answered  More than 50% of the time spent in this visit was counseling the patient about  illness and treatment options         1. Cyst of pineal gland  Result has been discussed with patient we will wait for MRI report  As general the pineal cyst is an incidental finding usually and might not be related to her current headaches  - MRI BRAIN W WO CONT; Future  - REFERRAL TO NEUROLOGY    2. New persistent daily headache    - REFERRAL TO NEUROLOGY    3. Dizzinesses    - REFERRAL TO NEUROLOGY    4. Needs flu shot  Flu vaccine was given is no complications  - INFLUENZA, FLUARIX, FLULAVAL, FLUZONE (AGE 6 MO+), AFLURIA(AGE 3Y+) IM, PF, 0.5 ML    Current Outpatient Medications   Medication Sig Dispense Refill    busPIRone (BUSPAR) 10 mg tablet Take 1 Tablet by mouth two (2) times daily as needed for PRN Reason (Other) (anxiety) for up to 90 days. 90 Tablet 0    valsartan (DIOVAN) 40 mg tablet Take 1 Tablet by mouth daily for 90 days. 90 Tablet 3    chlorthalidone (HYGROTON) 25 mg tablet Take 1 Tablet by mouth daily. 90 Tablet 3    pregabalin (Lyrica) 100 mg capsule Take 1 Capsule by mouth nightly for 90 days. Max Daily Amount: 100 mg. 90 Capsule 0    betamethasone dipropionate (DIPROSONE) 0.05 % topical cream Apply 1 g to affected area two (2) times a day. 15 g 5    fluticasone propionate (FLONASE) 50 mcg/actuation nasal spray 2 Sprays by Both Nostrils route daily. (Patient taking differently: 2 Sprays by Both Nostrils route as needed.) 1 Bottle 5       There are no problems to display for this patient.     Results for orders placed or performed during the hospital encounter of 04/07/22   CBC WITH AUTOMATED DIFF   Result Value Ref Range    WBC 8.3 4.6 - 13.2 K/uL    RBC 4.48 4.20 - 5.30 M/uL    HGB 14.1 12.0 - 16.0 g/dL    HCT 42.4 35.0 - 45.0 %    MCV 94.6 78.0 - 100.0 FL    MCH 31.5 24.0 - 34.0 PG    MCHC 33.3 31.0 - 37.0 g/dL    RDW 12.4 11.6 - 14.5 %    PLATELET 944 045 - 995 K/uL    MPV 10.2 9.2 - 11.8 FL    NRBC 0.0 0  WBC    ABSOLUTE NRBC 0.00 0.00 - 0.01 K/uL    NEUTROPHILS 52 40 - 73 %    LYMPHOCYTES 33 21 - 52 %    MONOCYTES 10 3 - 10 %    EOSINOPHILS 4 0 - 5 %    BASOPHILS 1 0 - 2 %    IMMATURE GRANULOCYTES 0 0.0 - 0.5 %    ABS. NEUTROPHILS 4.3 1.8 - 8.0 K/UL    ABS. LYMPHOCYTES 2.7 0.9 - 3.6 K/UL    ABS. MONOCYTES 0.9 0.05 - 1.2 K/UL    ABS. EOSINOPHILS 0.3 0.0 - 0.4 K/UL    ABS. BASOPHILS 0.1 0.0 - 0.1 K/UL    ABS. IMM. GRANS. 0.0 0.00 - 0.04 K/UL    DF AUTOMATED     METABOLIC PANEL, COMPREHENSIVE   Result Value Ref Range    Sodium 137 136 - 145 mmol/L    Potassium 3.9 3.5 - 5.5 mmol/L    Chloride 102 100 - 111 mmol/L    CO2 29 21 - 32 mmol/L    Anion gap 6 3.0 - 18 mmol/L    Glucose 90 74 - 99 mg/dL    BUN 14 7.0 - 18 MG/DL    Creatinine 0.70 0.6 - 1.3 MG/DL    BUN/Creatinine ratio 20 12 - 20      GFR est AA >60 >60 ml/min/1.73m2    GFR est non-AA >60 >60 ml/min/1.73m2    Calcium 9.5 8.5 - 10.1 MG/DL    Bilirubin, total 0.3 0.2 - 1.0 MG/DL    ALT (SGPT) 41 13 - 56 U/L    AST (SGOT) 18 10 - 38 U/L    Alk. phosphatase 58 45 - 117 U/L    Protein, total 7.5 6.4 - 8.2 g/dL    Albumin 4.1 3.4 - 5.0 g/dL    Globulin 3.4 2.0 - 4.0 g/dL    A-G Ratio 1.2 0.8 - 1.7       No visits with results within 3 Month(s) from this visit.    Latest known visit with results is:   Hospital Outpatient Visit on 04/07/2022   Component Date Value Ref Range Status    WBC 04/07/2022 8.3  4.6 - 13.2 K/uL Final    RBC 04/07/2022 4.48  4.20 - 5.30 M/uL Final    HGB 04/07/2022 14.1  12.0 - 16.0 g/dL Final    HCT 04/07/2022 42.4  35.0 - 45.0 % Final    MCV 04/07/2022 94.6  78.0 - 100.0 FL Final    MCH 04/07/2022 31.5 24.0 - 34.0 PG Final    MCHC 04/07/2022 33.3  31.0 - 37.0 g/dL Final    RDW 04/07/2022 12.4  11.6 - 14.5 % Final    PLATELET 86/55/9547 316  135 - 420 K/uL Final    MPV 04/07/2022 10.2  9.2 - 11.8 FL Final    NRBC 04/07/2022 0.0  0  WBC Final    ABSOLUTE NRBC 04/07/2022 0.00  0.00 - 0.01 K/uL Final    NEUTROPHILS 04/07/2022 52  40 - 73 % Final    LYMPHOCYTES 04/07/2022 33  21 - 52 % Final    MONOCYTES 04/07/2022 10  3 - 10 % Final    EOSINOPHILS 04/07/2022 4  0 - 5 % Final    BASOPHILS 04/07/2022 1  0 - 2 % Final    IMMATURE GRANULOCYTES 04/07/2022 0  0.0 - 0.5 % Final    ABS. NEUTROPHILS 04/07/2022 4.3  1.8 - 8.0 K/UL Final    ABS. LYMPHOCYTES 04/07/2022 2.7  0.9 - 3.6 K/UL Final    ABS. MONOCYTES 04/07/2022 0.9  0.05 - 1.2 K/UL Final    ABS. EOSINOPHILS 04/07/2022 0.3  0.0 - 0.4 K/UL Final    ABS. BASOPHILS 04/07/2022 0.1  0.0 - 0.1 K/UL Final    ABS. IMM. GRANS. 04/07/2022 0.0  0.00 - 0.04 K/UL Final    DF 04/07/2022 AUTOMATED    Final    Sodium 04/07/2022 137  136 - 145 mmol/L Final    Potassium 04/07/2022 3.9  3.5 - 5.5 mmol/L Final    Chloride 04/07/2022 102  100 - 111 mmol/L Final    CO2 04/07/2022 29  21 - 32 mmol/L Final    Anion gap 04/07/2022 6  3.0 - 18 mmol/L Final    Glucose 04/07/2022 90  74 - 99 mg/dL Final    BUN 04/07/2022 14  7.0 - 18 MG/DL Final    Creatinine 04/07/2022 0.70  0.6 - 1.3 MG/DL Final    BUN/Creatinine ratio 04/07/2022 20  12 - 20   Final    GFR est AA 04/07/2022 >60  >60 ml/min/1.73m2 Final    GFR est non-AA 04/07/2022 >60  >60 ml/min/1.73m2 Final    Comment: (NOTE)  Estimated GFR is calculated using the Modification of Diet in Renal   Disease (MDRD) Study equation, reported for both  Americans   (GFRAA) and non- Americans (GFRNA), and normalized to 1.73m2   body surface area. The physician must decide which value applies to   the patient. The MDRD study equation should only be used in   individuals age [place] or older.  It has not been validated for the   following: pregnant women, patients with serious comorbid conditions,   or on certain medications, or persons with extremes of body size,   muscle mass, or nutritional status. Calcium 04/07/2022 9.5  8.5 - 10.1 MG/DL Final    Bilirubin, total 04/07/2022 0.3  0.2 - 1.0 MG/DL Final    ALT (SGPT) 04/07/2022 41  13 - 56 U/L Final    AST (SGOT) 04/07/2022 18  10 - 38 U/L Final    Alk.  phosphatase 04/07/2022 58  45 - 117 U/L Final    Protein, total 04/07/2022 7.5  6.4 - 8.2 g/dL Final    Albumin 04/07/2022 4.1  3.4 - 5.0 g/dL Final    Globulin 04/07/2022 3.4  2.0 - 4.0 g/dL Final    A-G Ratio 04/07/2022 1.2  0.8 - 1.7   Final

## 2023-01-13 DIAGNOSIS — M50.30 DDD (DEGENERATIVE DISC DISEASE), CERVICAL: ICD-10-CM

## 2023-01-15 RX ORDER — PREGABALIN 100 MG/1
100 CAPSULE ORAL
Qty: 90 CAPSULE | Refills: 0 | Status: SHIPPED | OUTPATIENT
Start: 2023-01-15 | End: 2023-04-15

## 2023-01-26 ENCOUNTER — TELEPHONE (OUTPATIENT)
Dept: FAMILY MEDICINE CLINIC | Age: 48
End: 2023-01-26

## 2023-01-26 NOTE — TELEPHONE ENCOUNTER
Received MRI report overall within normal limit no significant abnormality I recommend that she should proceed with neurology appointment

## 2023-02-02 ENCOUNTER — PATIENT MESSAGE (OUTPATIENT)
Dept: FAMILY MEDICINE CLINIC | Age: 48
End: 2023-02-02

## 2023-02-02 NOTE — TELEPHONE ENCOUNTER
From: Frederick Gonzales  To: Thomas Foster MD  Sent: 2/2/2023 11:35 AM EST  Subject: Referral needed     Good morning-    I am in need of a referral to speak to this doctor on my concerns with my weight and sleep. Please provide a referral to visit him.    Jimmy Rebolledo MD  Specialties  Pulmonary & Critical Care, Sleep Medicine  [facility] [address] [place], [place] [address]is [address] [facility], [address]  Phone: [phone]  Fax: [phone]

## 2023-02-13 ENCOUNTER — TELEPHONE (OUTPATIENT)
Dept: FAMILY MEDICINE CLINIC | Facility: CLINIC | Age: 48
End: 2023-02-13

## 2023-02-13 DIAGNOSIS — G47.9 SLEEP DIFFICULTIES: Primary | ICD-10-CM

## 2023-02-13 DIAGNOSIS — R06.83 SNORING: ICD-10-CM

## 2023-02-13 DIAGNOSIS — E66.09 CLASS 1 OBESITY DUE TO EXCESS CALORIES WITH BODY MASS INDEX (BMI) OF 34.0 TO 34.9 IN ADULT, UNSPECIFIED WHETHER SERIOUS COMORBIDITY PRESENT: ICD-10-CM

## 2023-02-14 DIAGNOSIS — E34.8 CYST OF PINEAL GLAND: Primary | ICD-10-CM

## 2023-02-14 DIAGNOSIS — G44.52 NEW PERSISTENT DAILY HEADACHE: ICD-10-CM

## 2023-02-14 DIAGNOSIS — R42 DIZZINESSES: ICD-10-CM

## 2023-02-14 NOTE — TELEPHONE ENCOUNTER
Rukhsana Auguste MD  Specialties  Pulmonary & Critical Care, Sleep Medicine  [facility] [address] [place], [place] [address], [address]  Phone: [phone]    Fax: [phone]  Pradip Franco.  Pradip Knutson

## 2023-03-22 ENCOUNTER — OFFICE VISIT (OUTPATIENT)
Dept: FAMILY MEDICINE CLINIC | Facility: CLINIC | Age: 48
End: 2023-03-22

## 2023-03-22 ENCOUNTER — HOSPITAL ENCOUNTER (OUTPATIENT)
Facility: HOSPITAL | Age: 48
Setting detail: SPECIMEN
Discharge: HOME | End: 2023-03-25

## 2023-03-22 VITALS
WEIGHT: 223.8 LBS | BODY MASS INDEX: 35.12 KG/M2 | TEMPERATURE: 98.6 F | DIASTOLIC BLOOD PRESSURE: 78 MMHG | OXYGEN SATURATION: 96 % | SYSTOLIC BLOOD PRESSURE: 138 MMHG | HEART RATE: 80 BPM | HEIGHT: 67 IN | RESPIRATION RATE: 15 BRPM

## 2023-03-22 DIAGNOSIS — I10 ESSENTIAL (PRIMARY) HYPERTENSION: ICD-10-CM

## 2023-03-22 DIAGNOSIS — F41.1 GENERALIZED ANXIETY DISORDER: ICD-10-CM

## 2023-03-22 DIAGNOSIS — M54.50 CHRONIC MIDLINE LOW BACK PAIN WITHOUT SCIATICA: ICD-10-CM

## 2023-03-22 DIAGNOSIS — M50.30 OTHER CERVICAL DISC DEGENERATION, UNSPECIFIED CERVICAL REGION: ICD-10-CM

## 2023-03-22 DIAGNOSIS — G89.29 CHRONIC MIDLINE LOW BACK PAIN WITHOUT SCIATICA: ICD-10-CM

## 2023-03-22 DIAGNOSIS — M54.50 CHRONIC MIDLINE LOW BACK PAIN WITHOUT SCIATICA: Primary | ICD-10-CM

## 2023-03-22 DIAGNOSIS — Z12.31 BREAST CANCER SCREENING BY MAMMOGRAM: ICD-10-CM

## 2023-03-22 DIAGNOSIS — G89.29 CHRONIC MIDLINE LOW BACK PAIN WITHOUT SCIATICA: Primary | ICD-10-CM

## 2023-03-22 DIAGNOSIS — J34.0: ICD-10-CM

## 2023-03-22 DIAGNOSIS — L40.9 PSORIASIS: ICD-10-CM

## 2023-03-22 PROCEDURE — 80307 DRUG TEST PRSMV CHEM ANLYZR: CPT

## 2023-03-22 PROCEDURE — 3078F DIAST BP <80 MM HG: CPT | Performed by: LEGAL MEDICINE

## 2023-03-22 PROCEDURE — 3075F SYST BP GE 130 - 139MM HG: CPT | Performed by: LEGAL MEDICINE

## 2023-03-22 PROCEDURE — 99214 OFFICE O/P EST MOD 30 MIN: CPT | Performed by: LEGAL MEDICINE

## 2023-03-22 RX ORDER — GABAPENTIN 300 MG/1
300 CAPSULE ORAL 2 TIMES DAILY
Qty: 180 CAPSULE | Refills: 1 | Status: SHIPPED | OUTPATIENT
Start: 2023-03-22 | End: 2023-06-20

## 2023-03-22 RX ORDER — BUSPIRONE HYDROCHLORIDE 10 MG/1
10 TABLET ORAL 2 TIMES DAILY PRN
Qty: 60 TABLET | Refills: 2 | Status: SHIPPED | OUTPATIENT
Start: 2023-03-22 | End: 2023-04-21

## 2023-03-22 RX ORDER — BETAMETHASONE DIPROPIONATE 0.5 MG/G
1 CREAM TOPICAL 2 TIMES DAILY
Qty: 15 G | Refills: 2 | Status: SHIPPED | OUTPATIENT
Start: 2023-03-22 | End: 2023-04-21

## 2023-03-22 RX ORDER — BUSPIRONE HYDROCHLORIDE 10 MG/1
10 TABLET ORAL AS NEEDED
COMMUNITY
End: 2023-03-22 | Stop reason: SDUPTHER

## 2023-03-22 SDOH — ECONOMIC STABILITY: FOOD INSECURITY: WITHIN THE PAST 12 MONTHS, YOU WORRIED THAT YOUR FOOD WOULD RUN OUT BEFORE YOU GOT MONEY TO BUY MORE.: SOMETIMES TRUE

## 2023-03-22 SDOH — ECONOMIC STABILITY: FOOD INSECURITY: WITHIN THE PAST 12 MONTHS, THE FOOD YOU BOUGHT JUST DIDN'T LAST AND YOU DIDN'T HAVE MONEY TO GET MORE.: SOMETIMES TRUE

## 2023-03-22 SDOH — ECONOMIC STABILITY: INCOME INSECURITY: HOW HARD IS IT FOR YOU TO PAY FOR THE VERY BASICS LIKE FOOD, HOUSING, MEDICAL CARE, AND HEATING?: SOMEWHAT HARD

## 2023-03-22 SDOH — ECONOMIC STABILITY: HOUSING INSECURITY
IN THE LAST 12 MONTHS, WAS THERE A TIME WHEN YOU DID NOT HAVE A STEADY PLACE TO SLEEP OR SLEPT IN A SHELTER (INCLUDING NOW)?: NO

## 2023-03-22 ASSESSMENT — ANXIETY QUESTIONNAIRES
3. WORRYING TOO MUCH ABOUT DIFFERENT THINGS: 0
5. BEING SO RESTLESS THAT IT IS HARD TO SIT STILL: 0
1. FEELING NERVOUS, ANXIOUS, OR ON EDGE: 0
7. FEELING AFRAID AS IF SOMETHING AWFUL MIGHT HAPPEN: 0
GAD7 TOTAL SCORE: 0
4. TROUBLE RELAXING: 0
6. BECOMING EASILY ANNOYED OR IRRITABLE: 0
2. NOT BEING ABLE TO STOP OR CONTROL WORRYING: 0
IF YOU CHECKED OFF ANY PROBLEMS ON THIS QUESTIONNAIRE, HOW DIFFICULT HAVE THESE PROBLEMS MADE IT FOR YOU TO DO YOUR WORK, TAKE CARE OF THINGS AT HOME, OR GET ALONG WITH OTHER PEOPLE: NOT DIFFICULT AT ALL

## 2023-03-22 ASSESSMENT — ENCOUNTER SYMPTOMS
EYE ITCHING: 0
RHINORRHEA: 0
WHEEZING: 0
NAUSEA: 0
BACK PAIN: 1
VOMITING: 0
DIARRHEA: 0
EYE DISCHARGE: 0
BLOOD IN STOOL: 0
SORE THROAT: 0
COUGH: 0
CHOKING: 0
APNEA: 0
ANAL BLEEDING: 0
EYE REDNESS: 0
FACIAL SWELLING: 0
SINUS PRESSURE: 0
ABDOMINAL PAIN: 0
SINUS PAIN: 0
EYE PAIN: 0
CONSTIPATION: 0
CHEST TIGHTNESS: 0
STRIDOR: 0
SHORTNESS OF BREATH: 0

## 2023-03-22 ASSESSMENT — PATIENT HEALTH QUESTIONNAIRE - PHQ9
1. LITTLE INTEREST OR PLEASURE IN DOING THINGS: 0
SUM OF ALL RESPONSES TO PHQ QUESTIONS 1-9: 0
SUM OF ALL RESPONSES TO PHQ9 QUESTIONS 1 & 2: 0
SUM OF ALL RESPONSES TO PHQ QUESTIONS 1-9: 0
2. FEELING DOWN, DEPRESSED OR HOPELESS: 0

## 2023-03-22 NOTE — LETTER
Patient/Provider Agreement and Informed Consent  Controlled Substances   Name Matthias Russo     1975    MRN [id number]     The following Controlled Substance Agreement (CSA) exists for you to understand your personal responsibilities and to prevent misunderstandings while you are taking prescriptions of controlled substances including opioids, buprenorphine, stimulants, or benzodiazepines (controlled substances). Any violation of the CSA may result in appropriate reduction, tapering or discontinuation of controlled substances. 1) Prior to Prescribing Controlled Substances for Long Term Use: I understand that  ? I will use only one pharmacy location noted at the end of this agreement  ? I will not share my controlled substance with anyone as this is a federal crime  ? Controlled substance prescriptions will not exceed a 30-day supply  ? Lost, misplaced or stolen controlled substances will not be replaced. Early refill requests due to lost or stolen medications will not be granted  ? Controlled substances will only be prescribed during office business hours which are Monday to Friday, 8:00 am to 4:30 pm.  Twenty-four to 48 hours may be necessary to complete any refill requests. ? Controlled substances will not be changed or refilled during non-business hours which includes after 4:30 pm and on weekends and holidays  ? To prevent potentially dangerous interactions, I will need to stop using any controlled substances written by other medical practitioners (including dentists). ? If I obtain controlled substances from sources other than my primary provider, including other providers or emergency room visits, it will be considered a violation of this agreement, except for circumstances where a prescription is medically necessary, and documentation is provided by the treating provider  ? I will not increase my medication dosage without prior discussion with my provider  ?  Before refilling medication, office

## 2023-03-22 NOTE — PROGRESS NOTES
Alex Shaffer     Chief Complaint   Patient presents with    Medication Refill    Nose Problem     Unable to smell and pain in nose, unable to blow nose, can't sneeze     /78 (Site: Left Upper Arm, Position: Sitting, Cuff Size: Medium Adult)   Pulse 80   Temp 98.6 °F (37 °C) (Temporal)   Resp 15   Ht 5' 7\" (1.702 m)   Wt 223 lb 12.8 oz (101.5 kg)   LMP 03/15/2023 (Exact Date)   SpO2 96%   BMI 35.05 kg/m²         HPI:  Alex Shfafer  is here to resume gabapentin 300 mg 2 times daily   She tried Lyrical but was not helping she would like to resume gabapentin, she will be signing her controlled substance agreement today and will give urine test for drug  screening    Patient does have a history of psoriasis mainly cutaneous  She has been having a nonhealing ulcer of the lower part of her nasal septum          Past Medical History:   Diagnosis Date    Eczema     Hypertension     Nerve pain      Past Surgical History:   Procedure Laterality Date    CARPAL TUNNEL RELEASE Left 2019    COLONOSCOPY N/A 3/25/2022    SCREENING COLONOSCOPY /c hot snare polypectomy performed by Norris Hough MD at [address] [place] [address]      Social History     Tobacco Use    Smoking status: Former     Types: Cigarettes     Quit date: 2020     Years since quittin.8    Smokeless tobacco: Current   Substance Use Topics    Alcohol use: Yes       Family History   Problem Relation Age of Onset    Hypertension Mother     Diabetes Father     Hypertension Father     Breast Cancer Maternal Aunt        Review of Systems   Constitutional:  Negative for activity change, appetite change, chills, diaphoresis, fatigue and fever. HENT:  Negative for congestion, ear discharge, ear pain, facial swelling, hearing loss, mouth sores, nosebleeds, postnasal drip, rhinorrhea, sinus pressure, sinus pain, sneezing and sore throat.          Nasal septum ulceration   Eyes:  Negative for pain, discharge, redness

## 2023-03-22 NOTE — PROGRESS NOTES
Matthias Russo is a 52 y.o. female (: 1975) presenting to address:    Chief Complaint   Patient presents with    Medication Refill    Nose Problem     Unable to smell and pain in nose, unable to blow nose, can't sneeze       Vitals:    23 1310   BP: 138/78   Pulse: 80   Resp: 15   Temp: 98.6 °F (37 °C)   SpO2: 96%       Coordination of Care Questionaire:   1. \"Have you been to the ER, urgent care clinic since your last visit? Hospitalized since your last visit? \" No    2. \"Have you seen or consulted any other health care providers outside of the [address] [place] [address] since your last visit? \" No     3. For patients aged 39-70: Has the patient had a colonoscopy / FIT/ Cologuard? Yes - no Care Gap present      If the patient is female:    4. For patients aged 41-77: Has the patient had a mammogram within the past 2 years? No      5. For patients aged 21-65: Has the patient had a pap smear? Yes    Advanced Directive:   1. Do you have an Advanced Directive? No    2. Would you like information on Advanced Directives?  No

## 2023-03-24 ENCOUNTER — OFFICE VISIT (OUTPATIENT)
Age: 48
End: 2023-03-24

## 2023-03-24 VITALS
HEIGHT: 68 IN | TEMPERATURE: 97 F | HEART RATE: 84 BPM | DIASTOLIC BLOOD PRESSURE: 80 MMHG | RESPIRATION RATE: 20 BRPM | OXYGEN SATURATION: 97 % | WEIGHT: 223.4 LBS | SYSTOLIC BLOOD PRESSURE: 128 MMHG | BODY MASS INDEX: 33.86 KG/M2

## 2023-03-24 DIAGNOSIS — G44.221 CHRONIC TENSION-TYPE HEADACHE, INTRACTABLE: Primary | ICD-10-CM

## 2023-03-24 PROCEDURE — 99204 OFFICE O/P NEW MOD 45 MIN: CPT | Performed by: STUDENT IN AN ORGANIZED HEALTH CARE EDUCATION/TRAINING PROGRAM

## 2023-03-24 RX ORDER — AMITRIPTYLINE HYDROCHLORIDE 10 MG/1
10 TABLET, FILM COATED ORAL NIGHTLY
Qty: 30 TABLET | Refills: 3 | Status: SHIPPED | OUTPATIENT
Start: 2023-03-24 | End: 2023-04-23

## 2023-03-24 ASSESSMENT — ENCOUNTER SYMPTOMS
BACK PAIN: 1
COUGH: 0
NAUSEA: 1
VOMITING: 0
SHORTNESS OF BREATH: 0

## 2023-03-24 NOTE — PROGRESS NOTES
Kori Araujo is a 52 y.o. female . presents for New Patient and Headache      A 52years old female patient with medical history of hypertension referred here for evaluation of headache. She has been having headaches for the past 1-1/2 years. When she is trying to get up quickly, she feels a little dizzy and develops a pressure like sensation over her head. When she applies pressure over the occipital area, she feels better. She mostly sits at her workplace. 6-9/10 in severity. Takes ibuprofen which helps. Might have nausea but no vomiting. Headache might last all day. It might wake her up occasionally from sleep. Once it starts, does not go away even if she sits down. She might see spots in her visual field. No obvious blurring or diplopia. Might feel unsteady when she walks. No falls. No weakness of her extremities. Had surgery for carpal tunnel bilaterally; left ulnar surgery at the cubital tunnel. She has numbness and tingling over her hands. CT scan of the brain done in November 2022 showed 6 x 15 mm pineal gland cyst with mild mass effect on the tectum. Subsequently, an MRI of the brain with and without contrast was done in January 2023 and it was unremarkable other than mild nonspecific microvascular ischemic changes [the images were not available for review]. Review of Systems   Constitutional:  Negative for chills, fever and unexpected weight change. HENT:  Positive for tinnitus (intermittent). Negative for hearing loss. Respiratory:  Negative for cough and shortness of breath. Cardiovascular:  Negative for chest pain and leg swelling. Gastrointestinal:  Positive for nausea. Negative for vomiting. Genitourinary:  Negative for dysuria, frequency and urgency. Musculoskeletal:  Positive for back pain and neck pain. Skin:  Negative for rash. Neurological:  Positive for dizziness, light-headedness, numbness (hands; CTS) and headaches.  Negative for tremors, seizures,

## 2023-03-28 LAB
AMPHETAMINES UR QL SCN: NEGATIVE NG/ML
BARBITURATES UR QL SCN: NEGATIVE NG/ML
BENZODIAZ UR QL SCN: NEGATIVE NG/ML
BUPRENORPHINE UR QL: NEGATIVE NG/ML
BZE UR QL SCN: NEGATIVE NG/ML
CANNABINOIDS UR QL SCN: NEGATIVE NG/ML
CREAT UR-MCNC: 15.6 MG/DL (ref 20–300)
LABORATORY COMMENT REPORT: ABNORMAL
METHADONE UR QL SCN: NEGATIVE NG/ML
OPIATES UR QL SCN: NEGATIVE NG/ML
OXYCODONE+OXYMORPHONE UR QL SCN: NEGATIVE NG/ML
PCP UR QL: NEGATIVE NG/ML
PH UR: 6.8 (ref 4.5–8.9)
PROPOXYPH UR QL SCN: NEGATIVE NG/ML

## 2023-04-27 ENCOUNTER — COMPREHENSIVE EXAM (OUTPATIENT)
Facility: LOCATION | Age: 48
End: 2023-04-27

## 2023-04-27 DIAGNOSIS — H18.613: ICD-10-CM

## 2023-04-27 PROCEDURE — 92025 CPTRIZED CORNEAL TOPOGRAPHY: CPT

## 2023-04-27 PROCEDURE — 76514 ECHO EXAM OF EYE THICKNESS: CPT

## 2023-04-27 PROCEDURE — 92499OP2 OPTOMAP RETINAL SCREENING BOTH EYES

## 2023-04-27 PROCEDURE — 99214 OFFICE O/P EST MOD 30 MIN: CPT

## 2023-04-27 PROCEDURE — 92015 DETERMINE REFRACTIVE STATE: CPT

## 2023-04-27 ASSESSMENT — KERATOMETRY
OS_AXISANGLE_DEGREES: 124
OD_K2POWER_DIOPTERS: 7.50
OS_K2POWER_DIOPTERS: 7.57
OD_AXISANGLE_DEGREES: 80
OS_AXISANGLE2_DEGREES: 34
OD_AXISANGLE2_DEGREES: 170
OS_K1POWER_DIOPTERS: 7.89
OD_K1POWER_DIOPTERS: 8.13

## 2023-04-27 ASSESSMENT — TONOMETRY
OD_IOP_MMHG: 12
OS_IOP_MMHG: 12

## 2023-04-27 ASSESSMENT — VISUAL ACUITY
OS_CC: 20/30
OU_CC: 20/25-1
OU_CC: 20/25
OD_CC: 20/25-1
OS_CC: 20/25-1
OD_CC: 20/30

## 2023-05-03 NOTE — PROGRESS NOTES
Hilaria Sherman presents today for   Chief Complaint   Patient presents with    Hypertension    Insomnia       Is someone accompanying this pt? no    Is the patient using any DME equipment during OV? no    Depression Screening:  3 most recent PHQ Screens 10/28/2019   Little interest or pleasure in doing things Not at all   Feeling down, depressed, irritable, or hopeless Not at all   Total Score PHQ 2 0       Learning Assessment:  Learning Assessment 10/28/2019   PRIMARY LEARNER Patient   PRIMARY LANGUAGE ENGLISH   LEARNER PREFERENCE PRIMARY PICTURES     LISTENING     READING     DEMONSTRATION     VIDEOS   ANSWERED BY patient   RELATIONSHIP SELF       Abuse Screening:  Abuse Screening Questionnaire 10/28/2019   Do you ever feel afraid of your partner? N   Are you in a relationship with someone who physically or mentally threatens you? N   Is it safe for you to go home? Y       Fall Risk  Fall Risk Assessment, last 12 mths 10/28/2019   Able to walk? Yes   Fall in past 12 months? No       Health Maintenance reviewed and discussed and ordered per Provider. Health Maintenance Due   Topic Date Due    Pneumococcal 0-64 years (1 of 1 - PPSV23) 09/10/1981    DTaP/Tdap/Td series (1 - Tdap) 09/10/1986    PAP AKA CERVICAL CYTOLOGY  09/10/1996       Pt currently taking Antiplatelet therapy? no    Coordination of Care:  1. Have you been to the ER, urgent care clinic since your last visit? Hospitalized since your last visit? Yes, carpal tunnel surgery    2. Have you seen or consulted any other health care providers outside of the [address] [place] [address] since your last visit? Include any pap smears or colon screening.  no no lesions, no deformities, no traumatic injuries, no significant scars are present, chest wall non-tender, no masses present, breathing is unlabored without accessory muscle use,normal breath sounds

## 2023-06-08 ENCOUNTER — CONTACT LENSES/GLASSES VISIT (OUTPATIENT)
Facility: LOCATION | Age: 48
End: 2023-06-08

## 2023-06-08 DIAGNOSIS — Z97.3: ICD-10-CM

## 2023-06-08 PROCEDURE — 92310-F CONTACT LENS FITTING FOLLOW UP

## 2023-06-08 ASSESSMENT — KERATOMETRY
OS_K1POWER_DIOPTERS: 7.89
OD_K1POWER_DIOPTERS: 8.13
OS_AXISANGLE2_DEGREES: 34
OD_K2POWER_DIOPTERS: 7.50
OD_AXISANGLE2_DEGREES: 170
OS_K2POWER_DIOPTERS: 7.57
OD_AXISANGLE_DEGREES: 80
OS_AXISANGLE_DEGREES: 124

## 2023-06-08 ASSESSMENT — VISUAL ACUITY
OD_CC: 20/30
OS_CC: 20/40
OS_CC: 20/30
OU_CC: 20/25
OU_CC: 20/30
OD_CC: 20/25-1

## 2023-06-15 DIAGNOSIS — M50.30 OTHER CERVICAL DISC DEGENERATION, UNSPECIFIED CERVICAL REGION: ICD-10-CM

## 2023-06-15 DIAGNOSIS — M54.50 CHRONIC MIDLINE LOW BACK PAIN WITHOUT SCIATICA: ICD-10-CM

## 2023-06-15 DIAGNOSIS — G89.29 CHRONIC MIDLINE LOW BACK PAIN WITHOUT SCIATICA: ICD-10-CM

## 2023-06-30 NOTE — TELEPHONE ENCOUNTER
Future Appointments   Date Time Provider 4600  46[facility]   7/5/2023 11:45 AM Therese Carranza MD [facility] BS [facility]

## 2023-07-05 ENCOUNTER — OFFICE VISIT (OUTPATIENT)
Dept: FAMILY MEDICINE CLINIC | Facility: CLINIC | Age: 48
End: 2023-07-05
Payer: COMMERCIAL

## 2023-07-05 VITALS
DIASTOLIC BLOOD PRESSURE: 82 MMHG | HEIGHT: 68 IN | SYSTOLIC BLOOD PRESSURE: 132 MMHG | RESPIRATION RATE: 14 BRPM | WEIGHT: 216.8 LBS | HEART RATE: 73 BPM | OXYGEN SATURATION: 97 % | TEMPERATURE: 98.7 F | BODY MASS INDEX: 32.86 KG/M2

## 2023-07-05 DIAGNOSIS — G89.29 CHRONIC MIDLINE LOW BACK PAIN WITHOUT SCIATICA: ICD-10-CM

## 2023-07-05 DIAGNOSIS — G44.221 CHRONIC TENSION-TYPE HEADACHE, INTRACTABLE: ICD-10-CM

## 2023-07-05 DIAGNOSIS — M79.675 GREAT TOE PAIN, LEFT: ICD-10-CM

## 2023-07-05 DIAGNOSIS — M54.50 CHRONIC MIDLINE LOW BACK PAIN WITHOUT SCIATICA: ICD-10-CM

## 2023-07-05 DIAGNOSIS — F41.1 GENERALIZED ANXIETY DISORDER: ICD-10-CM

## 2023-07-05 DIAGNOSIS — I10 ESSENTIAL HYPERTENSION: Primary | ICD-10-CM

## 2023-07-05 DIAGNOSIS — M50.30 OTHER CERVICAL DISC DEGENERATION, UNSPECIFIED CERVICAL REGION: ICD-10-CM

## 2023-07-05 DIAGNOSIS — L08.9 SKIN INFECTION: ICD-10-CM

## 2023-07-05 DIAGNOSIS — J30.9 ALLERGIC RHINITIS, UNSPECIFIED SEASONALITY, UNSPECIFIED TRIGGER: ICD-10-CM

## 2023-07-05 PROCEDURE — 3079F DIAST BP 80-89 MM HG: CPT | Performed by: LEGAL MEDICINE

## 2023-07-05 PROCEDURE — 99214 OFFICE O/P EST MOD 30 MIN: CPT | Performed by: LEGAL MEDICINE

## 2023-07-05 PROCEDURE — 3075F SYST BP GE 130 - 139MM HG: CPT | Performed by: LEGAL MEDICINE

## 2023-07-05 RX ORDER — FLUTICASONE PROPIONATE 50 MCG
2 SPRAY, SUSPENSION (ML) NASAL DAILY
Qty: 16 G | Refills: 5 | Status: SHIPPED | OUTPATIENT
Start: 2023-07-05

## 2023-07-05 RX ORDER — BUSPIRONE HYDROCHLORIDE 10 MG/1
10 TABLET ORAL 2 TIMES DAILY
Qty: 180 TABLET | Refills: 1 | Status: SHIPPED | OUTPATIENT
Start: 2023-07-05 | End: 2024-01-01

## 2023-07-05 RX ORDER — GABAPENTIN 300 MG/1
300 CAPSULE ORAL 2 TIMES DAILY
Qty: 180 CAPSULE | Refills: 1 | Status: SHIPPED | OUTPATIENT
Start: 2023-09-11 | End: 2024-03-09

## 2023-07-05 RX ORDER — CHLORTHALIDONE 25 MG/1
25 TABLET ORAL DAILY
Qty: 90 TABLET | Refills: 1 | Status: SHIPPED | OUTPATIENT
Start: 2023-07-05 | End: 2024-01-01

## 2023-07-05 RX ORDER — AMITRIPTYLINE HYDROCHLORIDE 10 MG/1
10 TABLET, FILM COATED ORAL NIGHTLY
Qty: 90 TABLET | Refills: 1 | Status: SHIPPED | OUTPATIENT
Start: 2023-07-05 | End: 2024-01-01

## 2023-07-05 RX ORDER — PHENTERMINE HYDROCHLORIDE 37.5 MG/1
37.5 TABLET ORAL DAILY
COMMUNITY
Start: 2023-06-20

## 2023-07-05 RX ORDER — CEPHALEXIN 500 MG/1
500 CAPSULE ORAL 2 TIMES DAILY
Qty: 20 CAPSULE | Refills: 0 | Status: SHIPPED | OUTPATIENT
Start: 2023-07-05 | End: 2023-07-15

## 2023-07-05 RX ORDER — VALSARTAN 40 MG/1
40 TABLET ORAL DAILY
Qty: 90 TABLET | Refills: 1 | Status: SHIPPED | OUTPATIENT
Start: 2023-07-05 | End: 2024-01-01

## 2023-07-05 ASSESSMENT — ENCOUNTER SYMPTOMS
FACIAL SWELLING: 0
COUGH: 0
EYE PAIN: 0
SHORTNESS OF BREATH: 0
APNEA: 0
ANAL BLEEDING: 0
EYE REDNESS: 0
BLOOD IN STOOL: 0
VOMITING: 0
WHEEZING: 0
CHEST TIGHTNESS: 0
EYE ITCHING: 0
SORE THROAT: 0
ROS SKIN COMMENTS: RIGHT BIG TOE PAIN
DIARRHEA: 0
CONSTIPATION: 0
EYE DISCHARGE: 0
BACK PAIN: 0
NAUSEA: 0
ABDOMINAL PAIN: 0
CHOKING: 0

## 2023-07-12 RX ORDER — GABAPENTIN 300 MG/1
CAPSULE ORAL
Qty: 180 CAPSULE | OUTPATIENT
Start: 2023-07-12

## 2023-12-11 DIAGNOSIS — M50.30 OTHER CERVICAL DISC DEGENERATION, UNSPECIFIED CERVICAL REGION: ICD-10-CM

## 2023-12-11 DIAGNOSIS — G89.29 CHRONIC MIDLINE LOW BACK PAIN WITHOUT SCIATICA: ICD-10-CM

## 2023-12-11 DIAGNOSIS — M54.50 CHRONIC MIDLINE LOW BACK PAIN WITHOUT SCIATICA: ICD-10-CM

## 2023-12-11 RX ORDER — GABAPENTIN 300 MG/1
300 CAPSULE ORAL 2 TIMES DAILY
Qty: 180 CAPSULE | Refills: 1 | Status: SHIPPED | OUTPATIENT
Start: 2023-12-11 | End: 2024-06-08

## 2023-12-11 NOTE — TELEPHONE ENCOUNTER
Received fax from [address] [place] [address] for refill of:    Gabapentin 300 mg cap; qty 180 w/1 refill

## 2024-03-26 ENCOUNTER — COMPREHENSIVE EXAM (OUTPATIENT)
Facility: LOCATION | Age: 49
End: 2024-03-26

## 2024-03-26 DIAGNOSIS — H18.613: ICD-10-CM

## 2024-03-26 PROCEDURE — 92499OP2 OPTOMAP RETINAL SCREENING BOTH EYES

## 2024-03-26 PROCEDURE — 92025 CPTRIZED CORNEAL TOPOGRAPHY: CPT

## 2024-03-26 PROCEDURE — 92310-E CONTACT LENS FITTING ESTABLISH PATIENT

## 2024-03-26 PROCEDURE — 99214 OFFICE O/P EST MOD 30 MIN: CPT

## 2024-03-26 PROCEDURE — 92015 DETERMINE REFRACTIVE STATE: CPT

## 2024-03-26 ASSESSMENT — KERATOMETRY
OD_AXISANGLE_DEGREES: 077
OS_AXISANGLE2_DEGREES: 43
OS_K2POWER_DIOPTERS: 45.00
OS_AXISANGLE_DEGREES: 133
OD_AXISANGLE2_DEGREES: 167
OD_K2POWER_DIOPTERS: 45.50
OS_K1POWER_DIOPTERS: 43.25
OD_K1POWER_DIOPTERS: 42.00

## 2024-03-26 ASSESSMENT — VISUAL ACUITY
OU_CC: 20/50
OD_CC: 20/50
OS_CC: 20/50
OU_CC: 20/25-1
OS_CC: 20/30
OD_CC: 20/30